# Patient Record
Sex: MALE | Race: BLACK OR AFRICAN AMERICAN | Employment: FULL TIME | URBAN - METROPOLITAN AREA
[De-identification: names, ages, dates, MRNs, and addresses within clinical notes are randomized per-mention and may not be internally consistent; named-entity substitution may affect disease eponyms.]

---

## 2017-01-22 ENCOUNTER — HOSPITAL ENCOUNTER (EMERGENCY)
Age: 52
Discharge: HOME | End: 2017-01-22
Attending: EMERGENCY MEDICINE
Payer: COMMERCIAL

## 2017-01-22 VITALS
SYSTOLIC BLOOD PRESSURE: 129 MMHG | BODY MASS INDEX: 25.01 KG/M2 | HEIGHT: 68 IN | WEIGHT: 165 LBS | OXYGEN SATURATION: 96 % | RESPIRATION RATE: 16 BRPM | TEMPERATURE: 97.6 F | HEART RATE: 63 BPM | DIASTOLIC BLOOD PRESSURE: 82 MMHG

## 2017-01-22 DIAGNOSIS — M54.50 ACUTE RIGHT-SIDED LOW BACK PAIN WITHOUT SCIATICA: Primary | ICD-10-CM

## 2017-01-22 PROCEDURE — 99282 EMERGENCY DEPT VISIT SF MDM: CPT

## 2017-01-22 RX ORDER — METHOCARBAMOL 750 MG/1
750 TABLET, FILM COATED ORAL 3 TIMES DAILY
Qty: 15 TAB | Refills: 0 | Status: SHIPPED | OUTPATIENT
Start: 2017-01-22 | End: 2017-09-26

## 2017-01-22 NOTE — ED PROVIDER NOTES
HPI Comments: 54yo male presents to ER complaining of right lower back pain that started 3 days ago. States he woke up in the morning and when he was getting out of bed felt the discomfort. States he's taken Tylenol and Aleve with little relief. Pain with movement. Denies any weakness, numbness, urinary/bowel complaints, fever/chills. Patient is a 46 y.o. male presenting with back pain. Back Pain    Pertinent negatives include no fever, no numbness, no abdominal pain and no weakness. Past Medical History:   Diagnosis Date    Anemia     Asthma     Asthma     Groin swelling        No past surgical history on file. No family history on file. Social History     Social History    Marital status: SINGLE     Spouse name: N/A    Number of children: N/A    Years of education: N/A     Occupational History    Not on file. Social History Main Topics    Smoking status: Never Smoker    Smokeless tobacco: Never Used    Alcohol use No    Drug use: Not on file    Sexual activity: Not on file     Other Topics Concern    Not on file     Social History Narrative    No narrative on file         ALLERGIES: Review of patient's allergies indicates no known allergies. Review of Systems   Constitutional: Negative for activity change, appetite change, chills and fever. Respiratory: Negative. Cardiovascular: Negative. Gastrointestinal: Negative. Negative for abdominal pain. Genitourinary: Negative for difficulty urinating. Musculoskeletal: Positive for back pain. Negative for gait problem and joint swelling. Skin: Negative. Neurological: Negative for weakness and numbness. All other systems reviewed and are negative. Vitals:    01/22/17 0848   BP: 129/82   Pulse: 63   Resp: 16   Temp: 97.6 °F (36.4 °C)   SpO2: 96%   Weight: 74.8 kg (165 lb)   Height: 5' 8\" (1.727 m)            Physical Exam   Constitutional: He is oriented to person, place, and time.  He appears well-developed and well-nourished. No distress. Neck: Normal range of motion. Cardiovascular: Normal rate, regular rhythm and normal heart sounds. Pulmonary/Chest: Effort normal and breath sounds normal.   Musculoskeletal:   No midline thoracolumbar TTP. Right lumbar musculature is TTP. Pain with extension, rotation or torso. Neurological: He is alert and oriented to person, place, and time. Skin: He is not diaphoretic. Psychiatric: He has a normal mood and affect. Nursing note and vitals reviewed. MDM  Number of Diagnoses or Management Options  Diagnosis management comments: 54yo male with muscular lumbar pain. Rx for Robaxin. Work note x 3 days. Advised to continue Tylenol and Aleve. PCP follow up.      ED Course       Procedures

## 2017-01-22 NOTE — ED TRIAGE NOTES
Pt c/o of right lower back pain that started Thursday morning. Pt has been self medicating with OTC NSAIDS with only temp. Relief. He does not recall any injury that would have precipitated the pain.

## 2017-01-22 NOTE — LETTER
NOTIFICATION RETURN TO WORK / SCHOOL 
 
1/22/2017 10:08 AM 
 
Mr. Selene Palacios [address] [facility]  [place] [address] To Whom It May Concern: 
 
Selene Palacios is currently under the care of [address] [place] [address] EMERGENCY DEPT. He will return to work/school on: 01/26/17 If there are questions or concerns please have the patient contact our office.  
 
 
 
Sincerely, 
 
 
 
 
 
 
KADY Neil

## 2017-05-14 ENCOUNTER — APPOINTMENT (OUTPATIENT)
Dept: CT IMAGING | Age: 52
End: 2017-05-14
Attending: PHYSICIAN ASSISTANT
Payer: COMMERCIAL

## 2017-05-14 ENCOUNTER — HOSPITAL ENCOUNTER (EMERGENCY)
Age: 52
Discharge: HOME | End: 2017-05-14
Attending: EMERGENCY MEDICINE
Payer: COMMERCIAL

## 2017-05-14 VITALS
WEIGHT: 165 LBS | HEIGHT: 68 IN | HEART RATE: 74 BPM | RESPIRATION RATE: 19 BRPM | SYSTOLIC BLOOD PRESSURE: 128 MMHG | BODY MASS INDEX: 25.01 KG/M2 | DIASTOLIC BLOOD PRESSURE: 76 MMHG | TEMPERATURE: 97.4 F | OXYGEN SATURATION: 100 %

## 2017-05-14 DIAGNOSIS — R10.13 ABDOMINAL PAIN, EPIGASTRIC: Primary | ICD-10-CM

## 2017-05-14 LAB
ALBUMIN SERPL BCP-MCNC: 4.3 G/DL (ref 3.4–5)
ALBUMIN/GLOB SERPL: 1 {RATIO} (ref 0.8–1.7)
ALP SERPL-CCNC: 98 U/L (ref 45–117)
ALT SERPL-CCNC: 59 U/L (ref 16–61)
ANION GAP BLD CALC-SCNC: 9 MMOL/L (ref 3–18)
APPEARANCE UR: CLEAR
AST SERPL W P-5'-P-CCNC: 52 U/L (ref 15–37)
BASOPHILS # BLD AUTO: 0 K/UL (ref 0–0.06)
BASOPHILS # BLD: 0 % (ref 0–2)
BILIRUB SERPL-MCNC: 1 MG/DL (ref 0.2–1)
BILIRUB UR QL: NEGATIVE
BUN SERPL-MCNC: 22 MG/DL (ref 7–18)
BUN/CREAT SERPL: 19 (ref 12–20)
CALCIUM SERPL-MCNC: 9.4 MG/DL (ref 8.5–10.1)
CHLORIDE SERPL-SCNC: 101 MMOL/L (ref 100–108)
CO2 SERPL-SCNC: 30 MMOL/L (ref 21–32)
COLOR UR: YELLOW
CREAT SERPL-MCNC: 1.16 MG/DL (ref 0.6–1.3)
DIFFERENTIAL METHOD BLD: ABNORMAL
EOSINOPHIL # BLD: 0.4 K/UL (ref 0–0.4)
EOSINOPHIL NFR BLD: 6 % (ref 0–5)
ERYTHROCYTE [DISTWIDTH] IN BLOOD BY AUTOMATED COUNT: 12.6 % (ref 11.6–14.5)
GLOBULIN SER CALC-MCNC: 4.3 G/DL (ref 2–4)
GLUCOSE SERPL-MCNC: 102 MG/DL (ref 74–99)
GLUCOSE UR STRIP.AUTO-MCNC: NEGATIVE MG/DL
HCT VFR BLD AUTO: 48.5 % (ref 36–48)
HGB BLD-MCNC: 16.8 G/DL (ref 13–16)
HGB UR QL STRIP: NEGATIVE
KETONES UR QL STRIP.AUTO: NEGATIVE MG/DL
LEUKOCYTE ESTERASE UR QL STRIP.AUTO: NEGATIVE
LIPASE SERPL-CCNC: 207 U/L (ref 73–393)
LYMPHOCYTES # BLD AUTO: 14 % (ref 21–52)
LYMPHOCYTES # BLD: 1 K/UL (ref 0.9–3.6)
MCH RBC QN AUTO: 31 PG (ref 24–34)
MCHC RBC AUTO-ENTMCNC: 34.6 G/DL (ref 31–37)
MCV RBC AUTO: 89.5 FL (ref 74–97)
MONOCYTES # BLD: 0.3 K/UL (ref 0.05–1.2)
MONOCYTES NFR BLD AUTO: 5 % (ref 3–10)
NEUTS SEG # BLD: 5.3 K/UL (ref 1.8–8)
NEUTS SEG NFR BLD AUTO: 75 % (ref 40–73)
NITRITE UR QL STRIP.AUTO: NEGATIVE
PH UR STRIP: >8.5 [PH] (ref 5–8)
PLATELET # BLD AUTO: 237 K/UL (ref 135–420)
PMV BLD AUTO: 10 FL (ref 9.2–11.8)
POTASSIUM SERPL-SCNC: 4.1 MMOL/L (ref 3.5–5.5)
PROT SERPL-MCNC: 8.6 G/DL (ref 6.4–8.2)
PROT UR STRIP-MCNC: NEGATIVE MG/DL
RBC # BLD AUTO: 5.42 M/UL (ref 4.7–5.5)
SODIUM SERPL-SCNC: 140 MMOL/L (ref 136–145)
SP GR UR REFRACTOMETRY: 1.02 (ref 1–1.03)
UROBILINOGEN UR QL STRIP.AUTO: 0.2 EU/DL (ref 0.2–1)
WBC # BLD AUTO: 7 K/UL (ref 4.6–13.2)

## 2017-05-14 PROCEDURE — 96375 TX/PRO/DX INJ NEW DRUG ADDON: CPT

## 2017-05-14 PROCEDURE — 74011000250 HC RX REV CODE- 250: Performed by: PHYSICIAN ASSISTANT

## 2017-05-14 PROCEDURE — 74011636320 HC RX REV CODE- 636/320: Performed by: EMERGENCY MEDICINE

## 2017-05-14 PROCEDURE — 74011250636 HC RX REV CODE- 250/636: Performed by: PHYSICIAN ASSISTANT

## 2017-05-14 PROCEDURE — 83690 ASSAY OF LIPASE: CPT | Performed by: PHYSICIAN ASSISTANT

## 2017-05-14 PROCEDURE — 80053 COMPREHEN METABOLIC PANEL: CPT | Performed by: PHYSICIAN ASSISTANT

## 2017-05-14 PROCEDURE — 96361 HYDRATE IV INFUSION ADD-ON: CPT

## 2017-05-14 PROCEDURE — 74011250637 HC RX REV CODE- 250/637: Performed by: PHYSICIAN ASSISTANT

## 2017-05-14 PROCEDURE — 85025 COMPLETE CBC W/AUTO DIFF WBC: CPT | Performed by: PHYSICIAN ASSISTANT

## 2017-05-14 PROCEDURE — 96374 THER/PROPH/DIAG INJ IV PUSH: CPT

## 2017-05-14 PROCEDURE — 81003 URINALYSIS AUTO W/O SCOPE: CPT | Performed by: PHYSICIAN ASSISTANT

## 2017-05-14 PROCEDURE — 74177 CT ABD & PELVIS W/CONTRAST: CPT

## 2017-05-14 PROCEDURE — 99285 EMERGENCY DEPT VISIT HI MDM: CPT

## 2017-05-14 RX ORDER — KETOROLAC TROMETHAMINE 30 MG/ML
30 INJECTION, SOLUTION INTRAMUSCULAR; INTRAVENOUS
Status: COMPLETED | OUTPATIENT
Start: 2017-05-14 | End: 2017-05-14

## 2017-05-14 RX ORDER — ONDANSETRON 2 MG/ML
4 INJECTION INTRAMUSCULAR; INTRAVENOUS
Status: COMPLETED | OUTPATIENT
Start: 2017-05-14 | End: 2017-05-14

## 2017-05-14 RX ORDER — LIDOCAINE HYDROCHLORIDE 20 MG/ML
15 SOLUTION OROPHARYNGEAL AS NEEDED
Status: DISCONTINUED | OUTPATIENT
Start: 2017-05-14 | End: 2017-05-14 | Stop reason: HOSPADM

## 2017-05-14 RX ORDER — HYDROCODONE BITARTRATE AND ACETAMINOPHEN 5; 325 MG/1; MG/1
TABLET ORAL
Qty: 6 TAB | Refills: 0 | Status: SHIPPED | OUTPATIENT
Start: 2017-05-14 | End: 2017-09-26

## 2017-05-14 RX ADMIN — ONDANSETRON 4 MG: 2 INJECTION INTRAMUSCULAR; INTRAVENOUS at 09:05

## 2017-05-14 RX ADMIN — KETOROLAC TROMETHAMINE 30 MG: 30 INJECTION, SOLUTION INTRAMUSCULAR at 09:04

## 2017-05-14 RX ADMIN — LIDOCAINE HYDROCHLORIDE 15 ML: 20 SOLUTION ORAL; TOPICAL at 12:16

## 2017-05-14 RX ADMIN — SODIUM CHLORIDE 1000 ML: 900 INJECTION, SOLUTION INTRAVENOUS at 09:05

## 2017-05-14 RX ADMIN — PHENOBARBITAL 30 ML: 16.2; .1037; .0065; .0194 ELIXIR ORAL at 12:16

## 2017-05-14 RX ADMIN — IOPAMIDOL 100 ML: 612 INJECTION, SOLUTION INTRAVENOUS at 10:39

## 2017-05-14 NOTE — DISCHARGE INSTRUCTIONS

## 2017-05-14 NOTE — ED TRIAGE NOTES
Pt,  C/o abdominal pain, nausea started  About  3 Am, also c/o right lower back  On  & off  For 5 days

## 2017-05-14 NOTE — ED PROVIDER NOTES
HPI Comments: 47 yo M c/o epigastric pain which started this morning around 3 am. Got up and had a normal BM, then went back to bed. When he got up this morning pain was still there. Has not tried anything for pain. Has had some associated nausea. Denies fever, back pain, CP, SOB, vomiting. No other complaints. Patient is a 46 y.o. male presenting with abdominal pain, nausea, and back pain. Abdominal Pain    Associated symptoms include nausea. Pertinent negatives include no fever. Nausea    Associated symptoms include abdominal pain. Pertinent negatives include no fever. Back Pain    Associated symptoms include abdominal pain. Pertinent negatives include no fever. Past Medical History:   Diagnosis Date    Anemia     Asthma     Asthma     Groin swelling        No past surgical history on file. No family history on file. Social History     Social History    Marital status: SINGLE     Spouse name: N/A    Number of children: N/A    Years of education: N/A     Occupational History    Not on file. Social History Main Topics    Smoking status: Never Smoker    Smokeless tobacco: Never Used    Alcohol use No    Drug use: Not on file    Sexual activity: Not on file     Other Topics Concern    Not on file     Social History Narrative    No narrative on file         ALLERGIES: Review of patient's allergies indicates no known allergies. Review of Systems   Constitutional: Negative for fever. Gastrointestinal: Positive for abdominal pain and nausea. All other systems reviewed and are negative. Vitals:    05/14/17 0822 05/14/17 0845 05/14/17 0900 05/14/17 0915   BP: 137/85 142/80 142/87 136/80   Pulse: 73 69 71 70   Resp: 18 18 12 25   Temp: 97.4 °F (36.3 °C)      SpO2: 100%  99% 99%   Weight: 74.8 kg (165 lb)      Height: 5' 8\" (1.727 m)               Physical Exam   Constitutional: He is oriented to person, place, and time. He appears well-developed and well-nourished.  No distress. HENT:   Head: Normocephalic and atraumatic. Eyes: Conjunctivae are normal.   Neck: Normal range of motion. Neck supple. Cardiovascular: Normal rate, regular rhythm and normal heart sounds. Pulmonary/Chest: Effort normal and breath sounds normal. No respiratory distress. He has no wheezes. He has no rales. Abdominal: Soft. Normal appearance. There is tenderness in the epigastric area. Musculoskeletal: Normal range of motion. Neurological: He is alert and oriented to person, place, and time. Skin: Skin is warm and dry. Psychiatric: He has a normal mood and affect. His behavior is normal. Judgment and thought content normal.   Nursing note and vitals reviewed. MDM  Number of Diagnoses or Management Options  Abdominal pain, epigastric:     ED Course       Procedures    -------------------------------------------------------------------------------------------------------------------     EKG INTERPRETATIONS:      RADIOLOGY RESULTS:   CT ABD PELV W CONT   Final Result          LABORATORY RESULTS:  Recent Results (from the past 12 hour(s))   CBC WITH AUTOMATED DIFF    Collection Time: 05/14/17  9:00 AM   Result Value Ref Range    WBC 7.0 4.6 - 13.2 K/uL    RBC 5.42 4.70 - 5.50 M/uL    HGB 16.8 (H) 13.0 - 16.0 g/dL    HCT 48.5 (H) 36.0 - 48.0 %    MCV 89.5 74.0 - 97.0 FL    MCH 31.0 24.0 - 34.0 PG    MCHC 34.6 31.0 - 37.0 g/dL    RDW 12.6 11.6 - 14.5 %    PLATELET 906 458 - 433 K/uL    MPV 10.0 9.2 - 11.8 FL    NEUTROPHILS 75 (H) 40 - 73 %    LYMPHOCYTES 14 (L) 21 - 52 %    MONOCYTES 5 3 - 10 %    EOSINOPHILS 6 (H) 0 - 5 %    BASOPHILS 0 0 - 2 %    ABS. NEUTROPHILS 5.3 1.8 - 8.0 K/UL    ABS. LYMPHOCYTES 1.0 0.9 - 3.6 K/UL    ABS. MONOCYTES 0.3 0.05 - 1.2 K/UL    ABS. EOSINOPHILS 0.4 0.0 - 0.4 K/UL    ABS.  BASOPHILS 0.0 0.0 - 0.06 K/UL    DF AUTOMATED     METABOLIC PANEL, COMPREHENSIVE    Collection Time: 05/14/17  9:00 AM   Result Value Ref Range    Sodium 140 136 - 145 mmol/L    Potassium 4.1 3.5 - 5.5 mmol/L    Chloride 101 100 - 108 mmol/L    CO2 30 21 - 32 mmol/L    Anion gap 9 3.0 - 18 mmol/L    Glucose 102 (H) 74 - 99 mg/dL    BUN 22 (H) 7.0 - 18 MG/DL    Creatinine 1.16 0.6 - 1.3 MG/DL    BUN/Creatinine ratio 19 12 - 20      GFR est AA >60 >60 ml/min/1.73m2    GFR est non-AA >60 >60 ml/min/1.73m2    Calcium 9.4 8.5 - 10.1 MG/DL    Bilirubin, total 1.0 0.2 - 1.0 MG/DL    ALT (SGPT) 59 16 - 61 U/L    AST (SGOT) 52 (H) 15 - 37 U/L    Alk. phosphatase 98 45 - 117 U/L    Protein, total 8.6 (H) 6.4 - 8.2 g/dL    Albumin 4.3 3.4 - 5.0 g/dL    Globulin 4.3 (H) 2.0 - 4.0 g/dL    A-G Ratio 1.0 0.8 - 1.7     LIPASE    Collection Time: 05/14/17  9:00 AM   Result Value Ref Range    Lipase 207 73 - 393 U/L   URINALYSIS W/ RFLX MICROSCOPIC    Collection Time: 05/14/17 11:50 AM   Result Value Ref Range    Color YELLOW      Appearance CLEAR      Specific gravity 1.023 1.005 - 1.030      pH (UA) >8.5 (H) 5.0 - 8.0    Protein NEGATIVE  NEG mg/dL    Glucose NEGATIVE  NEG mg/dL    Ketone NEGATIVE  NEG mg/dL    Bilirubin NEGATIVE  NEG      Blood NEGATIVE  NEG      Urobilinogen 0.2 0.2 - 1.0 EU/dL    Nitrites NEGATIVE  NEG      Leukocyte Esterase NEGATIVE  NEG             CONSULTATIONS:        PROGRESS NOTES:    12:23 PM Pt well appearing and in NAD. Feeling better with meds here in ED. Labs unremarkable. CT negative for acute process. Will need GI f/u as outpatient. Gave return precautions. Lengthy D/W pt regarding possible worsening of pt's condition, need for follow up and strict ED return instructions for any worsening symptoms. DISPOSITION:  ED DIAGNOSIS & DISPOSITION INFORMATION  Diagnosis:   1.  Abdominal pain, epigastric          Disposition: home    Follow-up Information     Follow up With Details Comments 615 Luz Alvarado MD Call on 5/15/2017 To make a follow up appointment [address] [facility] 3[address] 12[address] [facility]      [facility]  Immediately if symptoms worsen [address] [place] [address] [address] [address]  [phone]          Patient's Medications   Start Taking    HYDROCODONE-ACETAMINOPHEN (NORCO) 5-325 MG PER TABLET    Take 1-2 tablets PO every 4-6 hours as needed for pain control. If over the counter ibuprofen or acetaminophen was suggested, then only take the vicodin for pain not well controlled with the over the counter medication. Continue Taking    ASCORBIC ACID (VITAMIN C) 500 MG TABLET    Take  by mouth. CETIRIZINE (ZYRTEC) 10 MG CAP    Take  by mouth. FLUTICASONE-SALMETEROL (ADVAIR DISKUS) 500-50 MCG/DOSE DISKUS INHALER    Take 1 Puff by inhalation every twelve (12) hours. INHALATIONAL SPACING DEVICE (AEROCHAMBER MV)    1 Each by Does Not Apply route as needed. METHOCARBAMOL (ROBAXIN-750) 750 MG TABLET    Take 1 Tab by mouth three (3) times daily. MULTIVITAMINS-MINERALS-LUTEIN (CENTRUM SILVER) TAB    Take  by mouth. PSEUDOEPHEDRINE (SUDAFED) 30 MG TABLET    Take 1 Tab by mouth every four (4) hours as needed for Congestion.    These Medications have changed    No medications on file   Stop Taking    No medications on file

## 2017-09-25 ENCOUNTER — APPOINTMENT (OUTPATIENT)
Dept: GENERAL RADIOLOGY | Age: 52
DRG: 202 | End: 2017-09-25
Attending: EMERGENCY MEDICINE
Payer: COMMERCIAL

## 2017-09-25 ENCOUNTER — HOSPITAL ENCOUNTER (INPATIENT)
Age: 52
LOS: 1 days | Discharge: HOME | DRG: 202 | End: 2017-09-26
Attending: EMERGENCY MEDICINE | Admitting: INTERNAL MEDICINE
Payer: COMMERCIAL

## 2017-09-25 DIAGNOSIS — J45.41 MODERATE PERSISTENT ASTHMA WITH ACUTE EXACERBATION: Primary | ICD-10-CM

## 2017-09-25 PROCEDURE — 77030029684 HC NEB SM VOL KT MONA -A

## 2017-09-25 PROCEDURE — 96375 TX/PRO/DX INJ NEW DRUG ADDON: CPT

## 2017-09-25 PROCEDURE — 94640 AIRWAY INHALATION TREATMENT: CPT

## 2017-09-25 PROCEDURE — 99283 EMERGENCY DEPT VISIT LOW MDM: CPT

## 2017-09-25 PROCEDURE — 96365 THER/PROPH/DIAG IV INF INIT: CPT

## 2017-09-25 PROCEDURE — 93005 ELECTROCARDIOGRAM TRACING: CPT

## 2017-09-25 PROCEDURE — 71010 XR CHEST PORT: CPT

## 2017-09-25 PROCEDURE — 96361 HYDRATE IV INFUSION ADD-ON: CPT

## 2017-09-25 RX ORDER — IPRATROPIUM BROMIDE AND ALBUTEROL SULFATE 2.5; .5 MG/3ML; MG/3ML
3 SOLUTION RESPIRATORY (INHALATION)
Status: DISCONTINUED | OUTPATIENT
Start: 2017-09-25 | End: 2017-09-26

## 2017-09-25 RX ORDER — ALBUTEROL SULFATE 0.83 MG/ML
2.5 SOLUTION RESPIRATORY (INHALATION)
Status: DISPENSED | OUTPATIENT
Start: 2017-09-25 | End: 2017-09-25

## 2017-09-25 RX ORDER — SODIUM CHLORIDE 9 MG/ML
100 INJECTION, SOLUTION INTRAVENOUS CONTINUOUS
Status: DISCONTINUED | OUTPATIENT
Start: 2017-09-25 | End: 2017-09-26

## 2017-09-25 RX ORDER — MAGNESIUM SULFATE HEPTAHYDRATE 500 MG/ML
2 INJECTION, SOLUTION INTRAMUSCULAR; INTRAVENOUS
Status: DISCONTINUED | OUTPATIENT
Start: 2017-09-25 | End: 2017-09-25 | Stop reason: RX

## 2017-09-25 RX ORDER — MAGNESIUM SULFATE HEPTAHYDRATE 40 MG/ML
2 INJECTION, SOLUTION INTRAVENOUS
Status: COMPLETED | OUTPATIENT
Start: 2017-09-25 | End: 2017-09-26

## 2017-09-25 NOTE — IP AVS SNAPSHOT
Nesha Herrera 
 
 
 [address] [facility] [address] [facility] Patient: Petra Mistry MRN: [id number] Current Discharge Medication List  
  
START taking these medications Dose & Instructions Dispensing Information Comments Morning Noon Evening Bedtime  
 predniSONE 20 mg tablet Commonly known as:  Donato Noss Start taking on:  9/27/2017 Your last dose was: Your next dose is:    
   
   
 Dose:  60 mg Take 3 Tabs by mouth daily (with breakfast) for 4 days. Indications: BRONCHIAL ASTHMA Quantity:  12 Tab Refills:  0 CONTINUE these medications which have NOT CHANGED Dose & Instructions Dispensing Information Comments Morning Noon Evening Bedtime CENTRUM SILVER Tab tablet Generic drug:  multivitamins-minerals-lutein Your last dose was: Your next dose is: Take  by mouth. Refills:  0  
     
   
   
   
  
 cyclobenzaprine 10 mg tablet Commonly known as:  FLEXERIL Your last dose was: Your next dose is:    
   
   
 Dose:  10 mg Take 10 mg by mouth daily as needed for Muscle Spasm(s). Refills:  0  
     
   
   
   
  
 fluticasone-salmeterol 500-50 mcg/dose diskus inhaler Commonly known as:  ADVAIR DISKUS Your last dose was: Your next dose is:    
   
   
 Dose:  1 Puff Take 1 Puff by inhalation every twelve (12) hours. Quantity:  14 Inhaler Refills:  0  
     
   
   
   
  
 inhalational spacing device Commonly known as:  AEROCHAMBER MV Your last dose was: Your next dose is:    
   
   
 Dose:  1 Each  
1 Each by Does Not Apply route as needed. Quantity:  1 Device Refills:  0  
     
   
   
   
  
 VITAMIN C 500 mg tablet Generic drug:  ascorbic acid (vitamin C) Your last dose was: Your next dose is: Take  by mouth. Refills:  0 ZyrTEC 10 mg Cap Generic drug:  Cetirizine Your last dose was: Your next dose is: Take  by mouth. Refills:  0 STOP taking these medications HYDROcodone-acetaminophen 5-325 mg per tablet Commonly known as:  NORCO  
   
  
 methocarbamol 750 mg tablet Commonly known as:  ROBAXIN-750  
   
  
 pseudoephedrine 30 mg tablet Commonly known as:  SUDAFED Where to Get Your Medications Information on where to get these meds will be given to you by the nurse or doctor. ! Ask your nurse or doctor about these medications  
  predniSONE 20 mg tablet

## 2017-09-25 NOTE — LETTER
[address] [place] [address] Dr Yasmany Rae [place] [address] 
[phone] Work/School Note Date: 9/26/17 To Whom It May concern: 
 
Marlin Obrien was seen and treated today in the hospital from 25 September 2017 until 1800 26 September 2017. Marlin Obrien may return to work on Thursday, 28 September 2017.  
 
Sincerely, 
 
 
 
 
Natalia Hayes MD

## 2017-09-25 NOTE — IP AVS SNAPSHOT
[facility] 
 
 
 [address] [facility] [address] [place], [place] [address]chloe Jean-Pierre Patient: Rosaura Hubbard MRN: [id number] You are allergic to the following No active allergies Recent Documentation Weight BMI Smoking Status 74.8 kg 25.09 kg/m2 Never Smoker Emergency Contacts Name Discharge Info Relation Home Work Mobile [address] Nandini Trejo CAREGIVER [3] Parent [1] [phone] About your hospitalization You were admitted on:  September 26, 2017 You last received care in the:  [facility] [facility] You were discharged on:  September 26, 2017 Unit phone number:  [phone] Why you were hospitalized Your primary diagnosis was:  Acute Asthma Exacerbation Your diagnoses also included:  Acute Kidney Injury (Hcc) Providers Seen During Your Hospitalizations Provider Role Specialty Primary office phone Antoinette Torres MD Attending Provider Emergency Medicine [phone] Goldy Vergara MD Attending Provider Internal Medicine [phone] Your Primary Care Physician (PCP) Primary Care Physician Office Phone Office Fax Phelps Marii GARCIA [phone] ** None ** Follow-up Information Follow up With Details Comments Contact Info Vicente Hamilton DO Schedule an appointment as soon as possible for a visit  will call tomorrow and schedule appointment. [address] [place] [id number] 
[phone] Current Discharge Medication List  
  
START taking these medications Dose & Instructions Dispensing Information Comments Morning Noon Evening Bedtime  
 predniSONE 20 mg tablet Commonly known as:  Rebecca Eraer Start taking on:  9/27/2017 Your last dose was: Your next dose is:    
   
   
 Dose:  60 mg Take 3 Tabs by mouth daily (with breakfast) for 4 days. Indications: BRONCHIAL ASTHMA Quantity:  12 Tab Refills:  0 CONTINUE these medications which have NOT CHANGED Dose & Instructions Dispensing Information Comments Morning Noon Evening Bedtime CENTRUM SILVER Tab tablet Generic drug:  multivitamins-minerals-lutein Your last dose was: Your next dose is: Take  by mouth. Refills:  0  
     
   
   
   
  
 cyclobenzaprine 10 mg tablet Commonly known as:  FLEXERIL Your last dose was: Your next dose is:    
   
   
 Dose:  10 mg Take 10 mg by mouth daily as needed for Muscle Spasm(s). Refills:  0  
     
   
   
   
  
 fluticasone-salmeterol 500-50 mcg/dose diskus inhaler Commonly known as:  ADVAIR DISKUS Your last dose was: Your next dose is:    
   
   
 Dose:  1 Puff Take 1 Puff by inhalation every twelve (12) hours. Quantity:  14 Inhaler Refills:  0  
     
   
   
   
  
 inhalational spacing device Commonly known as:  AEROCHAMBER MV Your last dose was: Your next dose is:    
   
   
 Dose:  1 Each  
1 Each by Does Not Apply route as needed. Quantity:  1 Device Refills:  0  
     
   
   
   
  
 VITAMIN C 500 mg tablet Generic drug:  ascorbic acid (vitamin C) Your last dose was: Your next dose is: Take  by mouth. Refills:  0 ZyrTEC 10 mg Cap Generic drug:  Cetirizine Your last dose was: Your next dose is: Take  by mouth. Refills:  0 STOP taking these medications HYDROcodone-acetaminophen 5-325 mg per tablet Commonly known as:  NORCO  
   
  
 methocarbamol 750 mg tablet Commonly known as:  ROBAXIN-750  
   
  
 pseudoephedrine 30 mg tablet Commonly known as:  SUDAFED Where to Get Your Medications Information on where to get these meds will be given to you by the nurse or doctor. ! Ask your nurse or doctor about these medications  
  predniSONE 20 mg tablet Discharge Instructions MyChart Activation Thank you for requesting access to Songdrop. Please follow the instructions below to securely access and download your online medical record. Songdrop allows you to send messages to your doctor, view your test results, renew your prescriptions, schedule appointments, and more. How Do I Sign Up? 1. In your internet browser, go to [email or web address]. 2. Click on the First Time User? Click Here link in the Sign In box. You will see the New Member Sign Up page. 3. Enter your Songdrop Access Code exactly as it appears below. You will not need to use this code after youve completed the sign-up process. If you do not sign up before the expiration date, you must request a new code. Songdrop Access Code: [email or web address] Expires: 2017 12:16 PM (This is the date your Songdrop access code will ) 4. Enter the last four digits of your Social Security Number (xxxx) and Date of Birth (mm/dd/yyyy) as indicated and click Submit. You will be taken to the next sign-up page. 5. Create a Songdrop ID. This will be your Songdrop login ID and cannot be changed, so think of one that is secure and easy to remember. 6. Create a Songdrop password. You can change your password at any time. 7. Enter your Password Reset Question and Answer. This can be used at a later time if you forget your password. 8. Enter your e-mail address. You will receive e-mail notification when new information is available in [address] 9. Click Sign Up. You can now view and download portions of your medical record. 10. Click the Download Summary menu link to download a portable copy of your medical information. Additional Information If you have questions, please visit the Frequently Asked Questions section of the Geosho website at [email or web address] Remember, WineMeNowhart is NOT to be used for urgent needs. For medical emergencies, dial 911. Patient armband removed and shredded DISCHARGE SUMMARY from Nurse The following personal items are in your possession at time of discharge: 
 
Dental Appliances: None Visual Aid: Glasses Home Medications: None Jewelry: Juanis Adjutant Clothing: At bedside Other Valuables: Cell Phone PATIENT INSTRUCTIONS: 
 
 
F-face looks uneven A-arms unable to move or move unevenly S-speech slurred or non-existent T-time-call 911 as soon as signs and symptoms begin-DO NOT go Back to bed or wait to see if you get better-TIME IS BRAIN. Warning Signs of HEART ATTACK Call 911 if you have these symptoms: 
? Chest discomfort. Most heart attacks involve discomfort in the center of the chest that lasts more than a few minutes, or that goes away and comes back. It can feel like uncomfortable pressure, squeezing, fullness, or pain. ? Discomfort in other areas of the upper body. Symptoms can include pain or discomfort in one or both arms, the back, neck, jaw, or stomach. ? Shortness of breath with or without chest discomfort. ? Other signs may include breaking out in a cold sweat, nausea, or lightheadedness. Don't wait more than five minutes to call [address]! Fast action can save your life. Calling 911 is almost always the fastest way to get lifesaving treatment. Emergency Medical Services staff can begin treatment when they arrive  up to an hour sooner than if someone gets to the hospital by car. The discharge information has been reviewed with the patient. The patient verbalized understanding.  
 
Discharge medications reviewed with the patient and appropriate educational materials and side effects teaching were provided. 
 
 
 
 
-Continue to drink plenty of fluids 
-Return to work on 9/28/2017(note provided to patient) 
-Call tomorrow to schedule a follow up appointment with [address]. Follow up with [address] in the next 2 to 7 days. Learning About Asthma Triggers What are triggers? When you have asthma, certain things can make your symptoms worse. These are called triggers. They include: · Cigarette smoke or air pollution. · Things you are allergic to, such as: 
¨ Pollen, mold, or dust mites. ¨ Pet hair, skin, or saliva. · Illnesses, like colds, flu, or pneumonia. · Exercise. · Dry, cold air. How do triggers affect asthma? Triggers can make it harder for your lungs to work as they should and can lead to sudden difficulty breathing and other symptoms. When you are around a trigger, an asthma attack is more likely. If your symptoms are severe, you may need emergency treatment or have to go to the hospital for treatment. If you know what your triggers are and can avoid them, you may be able to prevent asthma attacks, reduce how often you have them, and make them less severe. What can you do to avoid triggers? The first thing is to know your triggers. When you are having symptoms, note the things around you that might be causing them. Then look for patterns in what may be triggering your symptoms. Record your triggers on a piece of paper or in an asthma diary. When you have your list of possible triggers, work with your doctor to find ways to avoid them. You also can check how well your lungs are working by measuring your peak expiratory flow (PEF) throughout the day. Your PEF may drop when you are near things that trigger symptoms. Here are some ways to avoid a few common triggers. · Do not smoke or allow others to smoke around you.  If you need help quitting, talk to your doctor about stop-smoking programs and medicines. These can increase your chances of quitting for good. · If there is a lot of pollution, pollen, or dust outside, stay at home and keep your windows closed. Use an air conditioner or air filter in your home. Check your local weather report or newspaper for air quality and pollen reports. · Get a flu shot every year. Talk to your doctor about getting a pneumococcal shot. Wash your hands often to prevent infections. · Avoid exercising outdoors in cold weather. If you are outdoors in cold weather, wear a scarf around your face and breathe through your nose. How can you manage an asthma attack? · If you have an asthma action plan, follow the plan. In general: ¨ Use your quick-relief inhaler as directed by your doctor. If your symptoms do not get better after you use your medicine, have someone take you to the emergency room. Call an ambulance if needed. ¨ If your doctor has given you other inhaled medicines or steroid pills, take them as directed. Where can you learn more? Go to Paymetric.be Enter H229 in the search box to learn more about \"Learning About Asthma Triggers. \"  
© 7552-8318 Healthwise, Incorporated. Care instructions adapted under license by Batista Ramachandran [employer] (which disclaims liability or warranty for this information). This care instruction is for use with your licensed healthcare professional. If you have questions about a medical condition or this instruction, always ask your healthcare professional. [place] [address] any warranty or liability for your use of this information. Content Version: 28.5.561295; Last Revised: February 23, 2012 Asthma: Your Action Plan Sample Action Plan Controller medicine action plan Fill in the blank spaces and boxes that apply for all sections. · Name of your controller medicine: 
¨ ____________________________________________ · How much of this medicine do you take? ¨ ____________________________________________ · How often do you take this medicine? ¨ ____________________________________________ · Other instructions? ¨ ____________________________________________ Quick-relief medicine action plan · Name of your quick-relief medicine: 
¨ ____________________________________________ · How much of this medicine do you take? ¨ ____________________________________________ · How often do you take this medicine? ¨ ____________________________________________ Asthma Zones GREEN ZONE: This is where you want to be! Green zone symptoms · You have no shortness of breath or chest tightness. You are not coughing or wheezing. · You can do all of your usual activities. · You sleep well at night. Green zone peak flow (if you use a peak flow meter) · ______ or more (80% or more of your personal best) Green zone actions (Check the boxes and fill in the blank spaces that apply.) [ ] You take your controller medicine(s) every day. [ ] Orlena Catching are staying away from your asthma triggers. [ ] You take quick-relief medicine (called _____________________) ______ minutes before exercise. YELLOW ZONE: Your asthma is getting worse. Yellow zone symptoms · You are short of breath or have chest tightness. You are coughing or wheezing. · You have symptoms that keep you up at night. · You can do some, but not all, of your usual activities. Yellow zone peak flow (if you use a peak flow meter) · ______ to ______ (50% to 79% of your personal best) Yellow zone actions (Check the boxes and fill in the blank spaces that apply.) [ ] Take _____ puff(s) of quick-relief medicine called ______________________. Repeat _____ times. [ ] If your symptoms don't get better or your peak flow has not returned to the green zone in 1 hour, then: · [ ] Take _____ puff(s) of medicine called ______________________. Take it ____ times a day. · [ ] Begin or increase treatment with corticosteroid pills. Take ______ mg of medicine called ____________________________ every __________. · [ ] Call your doctor at this number: ____________________. RED ZONE: Danger! Red zone symptoms · You are very short of breath. · You can't do your usual activities. · Quick-relief medicine doesn't help. Or your symptoms don't get better after 24 hours in the yellow zone. Red zone peak flow (if you use a peak flow meter) · Less than _______ (less than 50% of your personal best) Red zone actions (Check the boxes and fill in the blank spaces that apply.) [ ] Take _____ puff(s) of quick-relief medicine called ____________________________. Repeat ______ times. [ ] Begin or increase treatment with corticosteroid pills. Take ________ mg now. [ ] Call your doctor at this number: _________________. If you can't contact your doctor, go to the emergency department. Call 911 or ___________________. [ ] Other numbers you might call are: ___________________________________. When should you call for help? Call 911 anytime you think you may need emergency care. For example, call if: 
· You have severe trouble breathing. Call your doctor now or seek immediate medical care if: 
· You are in the red zone of your asthma action plan. · You've used your quick-relief medicine but are still having trouble breathing. · You cough up blood. · You have new or worse trouble breathing. · You cough up dark brown or bloody mucus (sputum). Watch closely for changes in your health, and be sure to contact your doctor if: 
· You need to use quick-relief medicine more than 2 days each week (unless it's just for exercise). · Your coughing and wheezing get worse. Follow-up care is a key part of your treatment and safety. Be sure to make and go to all appointments, and call your doctor if you are having problems.  It's also a good idea to know your test results and keep a list of the medicines you take. Where can you learn more? Go to http://huy-leidy.info/. Enter 77 27 04 in the search box to learn more about \"Asthma: Your Action Plan. \" Current as of: March 25, 2017 Content Version: 11.3 © 4836-5828 [employer]. Care instructions adapted under license by [employer] (which disclaims liability or warranty for this information). If you have questions about a medical condition or this instruction, always ask your healthcare professional. [place] [address] any warranty or liability for your use of this information. Discharge Orders Procedure Order Date Status Priority Quantity Spec Type Associated Dx ACTIVITY AFTER DISCHARGE Patient should: Restrict weight bearing, Restrict lifting, Other (specify) Avoid strenuous activity over th e next several weeks. Pt encouraged to rest. Return to care for worsening shortness of breath, fevers, and symptom. .. 09/26/17 1656 Normal Routine 1 Comments:  Avoid strenuous activity over th e next several weeks. Pt encouraged to rest. Return to care for worsening shortness of breath, fevers, and symptoms that do no improve with current asthma treatment. Questions: Patient should:  Restrict weight bearing Patient should:  Restrict lifting Patient should: Other (specify) DIET REGULAR 09/26/17 1656 Normal Routine 1 MoneyMail Announcement We are excited to announce that we are making your provider's discharge notes available to you in MoneyMail. You will see these notes when they are completed and signed by the physician that discharged you from your recent hospital stay. If you have any questions or concerns about any information you see in MoneyMail, please call the Health Information Department where you were seen or reach out to your Primary Care Provider for more information about your plan of care. Introducing [facility]! Samantha Russo introduces TripAdvisor patient portal. Now you can access parts of your medical record, email your doctor's office, and request medication refills online. 1. In your internet browser, go to [email or web address] 2. Click on the First Time User? Click Here link in the Sign In box. You will see the New Member Sign Up page. 3. Enter your TripAdvisor Access Code exactly as it appears below. You will not need to use this code after youve completed the sign-up process. If you do not sign up before the expiration date, you must request a new code. · TripAdvisor Access Code: [email or web address] Expires: 12/25/2017 12:16 PM 
 
4. Enter the last four digits of your Social Security Number (xxxx) and Date of Birth (mm/dd/yyyy) as indicated and click Submit. You will be taken to the next sign-up page. 5. Create a TripAdvisor ID. This will be your TripAdvisor login ID and cannot be changed, so think of one that is secure and easy to remember. 6. Create a TripAdvisor password. You can change your password at any time. 7. Enter your Password Reset Question and Answer. This can be used at a later time if you forget your password. 8. Enter your e-mail address. You will receive e-mail notification when new information is available in [address] 9. Click Sign Up. You can now view and download portions of your medical record. 10. Click the Download Summary menu link to download a portable copy of your medical information. If you have questions, please visit the Frequently Asked Questions section of the TripAdvisor website. Remember, TripAdvisor is NOT to be used for urgent needs. For medical emergencies, dial 911. Now available from your iPhone and Android! General Information Please provide this summary of care documentation to your next provider. Patient Signature:  ____________________________________________________________ Date:  ____________________________________________________________  
  
Claudene Born Provider Signature:  ____________________________________________________________ Date:  ____________________________________________________________

## 2017-09-26 VITALS
BODY MASS INDEX: 25.09 KG/M2 | HEART RATE: 80 BPM | TEMPERATURE: 97.7 F | RESPIRATION RATE: 16 BRPM | OXYGEN SATURATION: 95 % | SYSTOLIC BLOOD PRESSURE: 133 MMHG | DIASTOLIC BLOOD PRESSURE: 82 MMHG | WEIGHT: 165 LBS

## 2017-09-26 PROBLEM — J45.901 ASTHMA EXACERBATION: Status: ACTIVE | Noted: 2017-09-26

## 2017-09-26 PROBLEM — N17.9 ACUTE KIDNEY INJURY (HCC): Status: ACTIVE | Noted: 2017-09-26

## 2017-09-26 LAB
ANION GAP SERPL CALC-SCNC: 13 MMOL/L (ref 3–18)
ANION GAP SERPL CALC-SCNC: 9 MMOL/L (ref 3–18)
ATRIAL RATE: 56 BPM
BASOPHILS # BLD: 0 K/UL (ref 0–0.1)
BASOPHILS # BLD: 0 K/UL (ref 0–0.1)
BASOPHILS NFR BLD: 0 % (ref 0–2)
BASOPHILS NFR BLD: 1 % (ref 0–2)
BUN SERPL-MCNC: 18 MG/DL (ref 7–18)
BUN SERPL-MCNC: 23 MG/DL (ref 7–18)
BUN/CREAT SERPL: 13 (ref 12–20)
BUN/CREAT SERPL: 16 (ref 12–20)
CALCIUM SERPL-MCNC: 8.8 MG/DL (ref 8.5–10.1)
CALCIUM SERPL-MCNC: 9.1 MG/DL (ref 8.5–10.1)
CALCULATED P AXIS, ECG09: 57 DEGREES
CALCULATED R AXIS, ECG10: 5 DEGREES
CALCULATED T AXIS, ECG11: 43 DEGREES
CHLORIDE SERPL-SCNC: 109 MMOL/L (ref 100–108)
CHLORIDE SERPL-SCNC: 109 MMOL/L (ref 100–108)
CO2 SERPL-SCNC: 19 MMOL/L (ref 21–32)
CO2 SERPL-SCNC: 25 MMOL/L (ref 21–32)
CREAT SERPL-MCNC: 1.37 MG/DL (ref 0.6–1.3)
CREAT SERPL-MCNC: 1.41 MG/DL (ref 0.6–1.3)
DIAGNOSIS, 93000: NORMAL
DIFFERENTIAL METHOD BLD: ABNORMAL
DIFFERENTIAL METHOD BLD: ABNORMAL
EOSINOPHIL # BLD: 0 K/UL (ref 0–0.4)
EOSINOPHIL # BLD: 0.6 K/UL (ref 0–0.4)
EOSINOPHIL NFR BLD: 0 % (ref 0–5)
EOSINOPHIL NFR BLD: 10 % (ref 0–5)
ERYTHROCYTE [DISTWIDTH] IN BLOOD BY AUTOMATED COUNT: 11.9 % (ref 11.6–14.5)
ERYTHROCYTE [DISTWIDTH] IN BLOOD BY AUTOMATED COUNT: 12.3 % (ref 11.6–14.5)
GLUCOSE SERPL-MCNC: 130 MG/DL (ref 74–99)
GLUCOSE SERPL-MCNC: 95 MG/DL (ref 74–99)
HCT VFR BLD AUTO: 42.2 % (ref 36–48)
HCT VFR BLD AUTO: 42.2 % (ref 36–48)
HGB BLD-MCNC: 14.5 G/DL (ref 13–16)
HGB BLD-MCNC: 14.7 G/DL (ref 13–16)
LYMPHOCYTES # BLD: 0.9 K/UL (ref 0.9–3.6)
LYMPHOCYTES # BLD: 2.4 K/UL (ref 0.9–3.6)
LYMPHOCYTES NFR BLD: 14 % (ref 21–52)
LYMPHOCYTES NFR BLD: 38 % (ref 21–52)
MAGNESIUM SERPL-MCNC: 1.9 MG/DL (ref 1.6–2.6)
MAGNESIUM SERPL-MCNC: 2.2 MG/DL (ref 1.6–2.6)
MCH RBC QN AUTO: 31 PG (ref 24–34)
MCH RBC QN AUTO: 31.1 PG (ref 24–34)
MCHC RBC AUTO-ENTMCNC: 34.4 G/DL (ref 31–37)
MCHC RBC AUTO-ENTMCNC: 34.8 G/DL (ref 31–37)
MCV RBC AUTO: 89.4 FL (ref 74–97)
MCV RBC AUTO: 90.4 FL (ref 74–97)
MONOCYTES # BLD: 0.1 K/UL (ref 0.05–1.2)
MONOCYTES # BLD: 0.4 K/UL (ref 0.05–1.2)
MONOCYTES NFR BLD: 1 % (ref 3–10)
MONOCYTES NFR BLD: 6 % (ref 3–10)
NEUTS SEG # BLD: 2.8 K/UL (ref 1.8–8)
NEUTS SEG # BLD: 5.4 K/UL (ref 1.8–8)
NEUTS SEG NFR BLD: 45 % (ref 40–73)
NEUTS SEG NFR BLD: 85 % (ref 40–73)
P-R INTERVAL, ECG05: 194 MS
PLATELET # BLD AUTO: 263 K/UL (ref 135–420)
PLATELET # BLD AUTO: 271 K/UL (ref 135–420)
PMV BLD AUTO: 10.7 FL (ref 9.2–11.8)
PMV BLD AUTO: 10.9 FL (ref 9.2–11.8)
POTASSIUM SERPL-SCNC: 4 MMOL/L (ref 3.5–5.5)
POTASSIUM SERPL-SCNC: 5.2 MMOL/L (ref 3.5–5.5)
Q-T INTERVAL, ECG07: 390 MS
QRS DURATION, ECG06: 74 MS
QTC CALCULATION (BEZET), ECG08: 376 MS
RBC # BLD AUTO: 4.67 M/UL (ref 4.7–5.5)
RBC # BLD AUTO: 4.72 M/UL (ref 4.7–5.5)
SODIUM SERPL-SCNC: 141 MMOL/L (ref 136–145)
SODIUM SERPL-SCNC: 143 MMOL/L (ref 136–145)
VENTRICULAR RATE, ECG03: 56 BPM
WBC # BLD AUTO: 6.2 K/UL (ref 4.6–13.2)
WBC # BLD AUTO: 6.4 K/UL (ref 4.6–13.2)

## 2017-09-26 PROCEDURE — 74011000250 HC RX REV CODE- 250: Performed by: EMERGENCY MEDICINE

## 2017-09-26 PROCEDURE — 65270000029 HC RM PRIVATE

## 2017-09-26 PROCEDURE — 83735 ASSAY OF MAGNESIUM: CPT

## 2017-09-26 PROCEDURE — 83735 ASSAY OF MAGNESIUM: CPT | Performed by: INTERNAL MEDICINE

## 2017-09-26 PROCEDURE — 74011000250 HC RX REV CODE- 250

## 2017-09-26 PROCEDURE — 74011636637 HC RX REV CODE- 636/637: Performed by: FAMILY MEDICINE

## 2017-09-26 PROCEDURE — 80048 BASIC METABOLIC PNL TOTAL CA: CPT

## 2017-09-26 PROCEDURE — 74011000250 HC RX REV CODE- 250: Performed by: FAMILY MEDICINE

## 2017-09-26 PROCEDURE — 74011250636 HC RX REV CODE- 250/636: Performed by: EMERGENCY MEDICINE

## 2017-09-26 PROCEDURE — 80048 BASIC METABOLIC PNL TOTAL CA: CPT | Performed by: INTERNAL MEDICINE

## 2017-09-26 PROCEDURE — 85025 COMPLETE CBC W/AUTO DIFF WBC: CPT

## 2017-09-26 PROCEDURE — 36415 COLL VENOUS BLD VENIPUNCTURE: CPT | Performed by: INTERNAL MEDICINE

## 2017-09-26 PROCEDURE — 74011250637 HC RX REV CODE- 250/637: Performed by: FAMILY MEDICINE

## 2017-09-26 RX ORDER — ENOXAPARIN SODIUM 100 MG/ML
40 INJECTION SUBCUTANEOUS EVERY 24 HOURS
Status: DISCONTINUED | OUTPATIENT
Start: 2017-09-26 | End: 2017-09-26 | Stop reason: HOSPADM

## 2017-09-26 RX ORDER — ALBUTEROL SULFATE 0.83 MG/ML
2.5 SOLUTION RESPIRATORY (INHALATION)
Status: COMPLETED | OUTPATIENT
Start: 2017-09-26 | End: 2017-09-26

## 2017-09-26 RX ORDER — CYCLOBENZAPRINE HCL 10 MG
10 TABLET ORAL
COMMUNITY

## 2017-09-26 RX ORDER — ALBUTEROL SULFATE 1.25 MG/3ML
2.5 SOLUTION RESPIRATORY (INHALATION)
Status: DISCONTINUED | OUTPATIENT
Start: 2017-09-26 | End: 2017-09-26 | Stop reason: HOSPADM

## 2017-09-26 RX ORDER — PREDNISONE 20 MG/1
60 TABLET ORAL
Status: DISCONTINUED | OUTPATIENT
Start: 2017-09-26 | End: 2017-09-26 | Stop reason: HOSPADM

## 2017-09-26 RX ORDER — LORATADINE 10 MG/1
10 TABLET ORAL DAILY
Status: DISCONTINUED | OUTPATIENT
Start: 2017-09-26 | End: 2017-09-26 | Stop reason: HOSPADM

## 2017-09-26 RX ORDER — IPRATROPIUM BROMIDE AND ALBUTEROL SULFATE 2.5; .5 MG/3ML; MG/3ML
3 SOLUTION RESPIRATORY (INHALATION)
Status: DISCONTINUED | OUTPATIENT
Start: 2017-09-26 | End: 2017-09-26

## 2017-09-26 RX ORDER — IPRATROPIUM BROMIDE AND ALBUTEROL SULFATE 2.5; .5 MG/3ML; MG/3ML
SOLUTION RESPIRATORY (INHALATION)
Status: COMPLETED
Start: 2017-09-26 | End: 2017-09-26

## 2017-09-26 RX ORDER — PREDNISONE 20 MG/1
60 TABLET ORAL
Qty: 12 TAB | Refills: 0 | Status: SHIPPED | OUTPATIENT
Start: 2017-09-27 | End: 2017-10-01

## 2017-09-26 RX ORDER — BUDESONIDE AND FORMOTEROL FUMARATE DIHYDRATE 160; 4.5 UG/1; UG/1
2 AEROSOL RESPIRATORY (INHALATION)
Status: DISCONTINUED | OUTPATIENT
Start: 2017-09-26 | End: 2017-09-26 | Stop reason: HOSPADM

## 2017-09-26 RX ORDER — ALBUTEROL SULFATE 1.25 MG/3ML
2.5 SOLUTION RESPIRATORY (INHALATION)
Status: DISCONTINUED | OUTPATIENT
Start: 2017-09-26 | End: 2017-09-26

## 2017-09-26 RX ADMIN — LORATADINE 10 MG: 10 TABLET ORAL at 10:54

## 2017-09-26 RX ADMIN — MAGNESIUM SULFATE IN WATER 2 G: 40 INJECTION, SOLUTION INTRAVENOUS at 00:29

## 2017-09-26 RX ADMIN — ALBUTEROL SULFATE 2.5 MG: 1.25 SOLUTION RESPIRATORY (INHALATION) at 16:56

## 2017-09-26 RX ADMIN — ALBUTEROL SULFATE 2.5 MG: 2.5 SOLUTION RESPIRATORY (INHALATION) at 00:34

## 2017-09-26 RX ADMIN — BUDESONIDE AND FORMOTEROL FUMARATE DIHYDRATE 2 PUFF: 160; 4.5 AEROSOL RESPIRATORY (INHALATION) at 17:08

## 2017-09-26 RX ADMIN — MULTIPLE VITAMINS W/ MINERALS TAB 1 TABLET: TAB at 10:54

## 2017-09-26 RX ADMIN — IPRATROPIUM BROMIDE AND ALBUTEROL SULFATE 3 ML: 2.5; .5 SOLUTION RESPIRATORY (INHALATION) at 00:42

## 2017-09-26 RX ADMIN — PREDNISONE 60 MG: 20 TABLET ORAL at 10:54

## 2017-09-26 RX ADMIN — METHYLPREDNISOLONE SODIUM SUCCINATE 125 MG: 125 INJECTION, POWDER, FOR SOLUTION INTRAMUSCULAR; INTRAVENOUS at 00:23

## 2017-09-26 RX ADMIN — SODIUM CHLORIDE 100 ML/HR: 900 INJECTION, SOLUTION INTRAVENOUS at 00:23

## 2017-09-26 RX ADMIN — IPRATROPIUM BROMIDE AND ALBUTEROL SULFATE 3 ML: .5; 3 SOLUTION RESPIRATORY (INHALATION) at 00:42

## 2017-09-26 NOTE — ED TRIAGE NOTES
Patient is wheezing , patient has a hx of asthma and has been intubated twice. Patient is in distress.

## 2017-09-26 NOTE — DISCHARGE SUMMARY
Discharge Summary  [facility]      Patient: Arnulfo Barraza Age: 46 y.o. Sex: male  : 1965    MRN: [id number]      DOA: 2017      Discharge Date: 2017      Attending: Dr. Vickie Thompson      PCP: Yudi Euceda, DO        ================================================================    Reason for Admission:   Asthma exacerbation    Discharge Diagnoses:   Asthma Exacerbation    Important notes to PCP/ follow-up studies and evaluations   Asthma: pt is compliant. Started on 5 day course of prednisone. Received 1st day of 60mg while inpatient. May benefit from singulair if symptoms persist. Consider repeat PFTS in 4 to 6 weeks. Pending labs and studies:  none  Operative Procedures:   None    Discharge Medications:     Discharge Medication List as of 2017  6:52 PM      START taking these medications    Details   predniSONE (DELTASONE) 20 mg tablet Take 3 Tabs by mouth daily (with breakfast) for 4 days. Indications: BRONCHIAL ASTHMA, Print, Disp-12 Tab, R-0         CONTINUE these medications which have NOT CHANGED    Details   cyclobenzaprine (FLEXERIL) 10 mg tablet Take 10 mg by mouth daily as needed for Muscle Spasm(s). , Historical Med      fluticasone-salmeterol (ADVAIR DISKUS) 500-50 mcg/dose diskus inhaler Take 1 Puff by inhalation every twelve (12) hours. , Print, Disp-14 Inhaler, R-0      Inhalational Spacing Device (AEROCHAMBER MV) 1 Each by Does Not Apply route as needed. , Print, Disp-1 Device, R-0      Cetirizine (ZYRTEC) 10 mg Cap Take  by mouth.  , Historical Med      multivitamins-minerals-lutein (CENTRUM SILVER) Tab Take  by mouth.  , Historical Med      ascorbic acid (VITAMIN C) 500 mg tablet Take  by mouth.  , Historical Med         STOP taking these medications       HYDROcodone-acetaminophen (NORCO) 5-325 mg per tablet Comments:   Reason for Stopping:         methocarbamol (ROBAXIN-750) 750 mg tablet Comments:   Reason for Stopping:         pseudoephedrine (SUDAFED) 30 mg tablet Comments:   Reason for Stopping:             Disposition: Home    Consultants:    none    Brief Hospital Course (including pertinent history and physical findings)  47 yo M with a history of Asthma and two prior intubations presented to the Emergency Department with increased wheezing despite. He denied precipitating cold symptoms, animal, occupational, or environmental triggers After work on 25 November, he noticed increased wheezing and shortness of breath that did not respond to his home albuterol nebulizer. He has been compliant with his advair twice daily. His peak flow in the Emergency department was 150. He had noted wheezes on exam, but normal respiratory mechanics. He was admitted for asthma exacerbation. Asthma Exacerbation with hx of Asthma, Moderate Persistent and two prior intubations   On discharge, his peak flows had improved to 350. His breathing symptoms with ambulation had also improved. He was compliant with his medications. His predicted peak flow is 600, but patient reports his best lifetime peak flow was 450. His ABG was appropriate for discharge. He commented during his ambulation pulse oximetry trial that he wanted to walk faster. His symptoms improved within several hours after multiple nebulized treatments, IV solumedrol 125, and 60 mg prednisone PO. His serial lung exams noted improvement throughout the day and were clear to auscultation on discharge. He remained afebrile, absent acute findings onc CXR, and did not have leukocytosis, so anti-biotics were not indicated. His respiratory status was near his baseline on discharge by the afternoon on the day of discharge. He was given a work excuse note for the dates of hospitalization and cleared to return to work on 28 Sept 2017.  Pt agreed to schedule a follow up appointment with Aleida Trejo in 48-72 hours.                -Complete five day course of Prednisone 60mg                -PCP follow up in 48-72 hours                -Consider formal  PFTs in 4-6 weeks                    Summarized key findings and results (labs, imaging studies, ECHO, cardiac cath, endoscopies, etc):  9/26 CXR: No acute findings  CBC w/Diff    Lab Results   Component Value Date/Time    WBC 6.4 09/26/2017 10:50 AM    HGB 14.5 09/26/2017 10:50 AM    HCT 42.2 09/26/2017 10:50 AM    PLATELET 842 05/54/1159 10:50 AM    MCV 90.4 09/26/2017 10:50 AM           Chemistry    Lab Results   Component Value Date/Time    Sodium 141 09/26/2017 10:50 AM    Potassium 5.2 09/26/2017 10:50 AM    Chloride 109 09/26/2017 10:50 AM    CO2 19 09/26/2017 10:50 AM    Anion gap 13 09/26/2017 10:50 AM    Glucose 130 09/26/2017 10:50 AM    BUN 18 09/26/2017 10:50 AM    Creatinine 1.37 09/26/2017 10:50 AM    BUN/Creatinine ratio 13 09/26/2017 10:50 AM    GFR est AA >60 09/26/2017 10:50 AM    GFR est non-AA 55 09/26/2017 10:50 AM    Calcium 9.1 09/26/2017 10:50 AM             Functional status and cognitive function:    Ambulates Independently  Status: alert, no distress, appears stated age    Diet: regular    Code status and advanced care plan: Full  Point of Contact:    Patient Education:  Patient was educated on the following topics prior to discharge:Asthma, prednisone    Follow-up:   Follow-up Information     Follow up With Details Comments [address], DO Schedule an appointment as soon as possible for a visit Knippa will call tomorrow and schedule appointment.  [address]  [id number] [facility]  [phone]              ================================================================  Pako Chin MD  PGY-1, [facility]

## 2017-09-26 NOTE — H&P
Admission History and Physical  [address] [facility]      Patient: Tamara Osei MRN: [id number]  CSN: [id number]    YOB: 1965  Age: 46 y.o. Sex: male      DOA: 9/25/2017       HPI:     Tamara Osei is a 46 y.o. male with PMH asthma, now presenting with complaint of chest tightness and wheezing. Pt has h/o asthma, has to use rescue inhaler a few times per week, experienced chest tightness and wheezing yesterday evening. He used his Advair and albuterol but did not feel any improvement. His only other symptom is a dry cough. He has not been ill lately and has no known sick contacts. ED Course: CXR, Duonebs, Solumedrol, Mg    Review of Systems  General ROS: negative for  - chills, fever  ENT ROS: negative for - headaches, hearing change or visual changes  Respiratory ROS: negative for - hemoptysis, positive for cough, chest tightness, shortness of breath, and wheezing  Cardiovascular ROS: negative for - chest pain, edema, or palpitations   Gastrointestinal ROS: negative for - abdominal pain, constipation, diarrhea, nausea/vomiting   Genito-Urinary ROS: negative for - dysuria, hematuria or urinary frequency/urgency  Musculoskeletal ROS: negative for - joint pain, joint swelling or muscle pain  Neurological ROS: negative for - dizziness, headaches  Dermatological ROS: negative for - rash or skin lesion changes      Past Medical History:   Diagnosis Date    Anemia     Asthma     Asthma     Groin swelling        History reviewed. No pertinent surgical history. History reviewed. No pertinent family history.     Social History     Social History    Marital status: SINGLE     Spouse name: N/A    Number of children: N/A    Years of education: N/A     Social History Main Topics    Smoking status: Never Smoker    Smokeless tobacco: Never Used    Alcohol use No    Drug use: None    Sexual activity: Not Asked     Other Topics Concern    None     Social History Narrative       No Known Allergies    Prior to Admission Medications   Prescriptions Last Dose Informant Patient Reported? Taking? Cetirizine (ZYRTEC) 10 mg Cap 2017 at Unknown time  Yes Yes   Sig: Take  by mouth. HYDROcodone-acetaminophen (NORCO) 5-325 mg per tablet Unknown at Unknown time  No No   Sig: Take 1-2 tablets PO every 4-6 hours as needed for pain control. If over the counter ibuprofen or acetaminophen was suggested, then only take the vicodin for pain not well controlled with the over the counter medication. Inhalational Spacing Device (AEROCHAMBER MV) 2017 at Unknown time  No Yes   Si Each by Does Not Apply route as needed. ascorbic acid (VITAMIN C) 500 mg tablet 2017 at Unknown time  Yes Yes   Sig: Take  by mouth. fluticasone-salmeterol (ADVAIR DISKUS) 500-50 mcg/dose diskus inhaler 2017 at Unknown time  No Yes   Sig: Take 1 Puff by inhalation every twelve (12) hours. methocarbamol (ROBAXIN-750) 750 mg tablet Unknown at Unknown time  No No   Sig: Take 1 Tab by mouth three (3) times daily. multivitamins-minerals-lutein (CENTRUM SILVER) Tab 2017 at Unknown time  Yes Yes   Sig: Take  by mouth.     pseudoephedrine (SUDAFED) 30 mg tablet Unknown at Unknown time  No No   Sig: Take 1 Tab by mouth every four (4) hours as needed for Congestion. Facility-Administered Medications: None       Physical Exam:     Patient Vitals for the past 24 hrs:   Pulse Resp BP SpO2   17 2104 - 28 136/88 -   17 2101 93 - - 91 %       Physical Exam:   General:  Alert and Responsive and in No acute distress. HEENT: Conjunctiva pink, sclera anicteric. EOMI. Pharynx moist, nonerythematous. Moist mucous membranes. Thyroid not enlarged, no nodules. No cervical, supraclavicular, occipital or submandibular lymphadenopathy. No other gross abnormalities appreciated. CV:  RRR. No murmurs, rubs, or gallops appreciated. No visible pulsations or thrills. RESP:  Unlabored breathing.  Equal expansion bilaterally. Expiratory wheezes bilaterally. Bilateral chest tenderness   ABD:  Soft, nontender, nondistended. Normoactive bowel sounds. No hepatosplenomegaly. No suprapubic tenderness. MS:  No joint deformity or instability. No atrophy. Neuro:  5/5 strength bilateral upper extremities and lower extremities. A+Ox3. Ext:  No edema. 2+ radial and dp pulses bilaterally. Skin:  No rashes, lesions, or ulcers. Good turgor. IMAGING:     Recent Results (from the past 12 hour(s))   METABOLIC PANEL, BASIC    Collection Time: 09/26/17 12:03 AM   Result Value Ref Range    Sodium 143 136 - 145 mmol/L    Potassium 4.0 3.5 - 5.5 mmol/L    Chloride 109 (H) 100 - 108 mmol/L    CO2 25 21 - 32 mmol/L    Anion gap 9 3.0 - 18 mmol/L    Glucose 95 74 - 99 mg/dL    BUN 23 (H) 7.0 - 18 MG/DL    Creatinine 1.41 (H) 0.6 - 1.3 MG/DL    BUN/Creatinine ratio 16 12 - 20      GFR est AA >60 >60 ml/min/1.73m2    GFR est non-AA 53 (L) >60 ml/min/1.73m2    Calcium 8.8 8.5 - 10.1 MG/DL   MAGNESIUM    Collection Time: 09/26/17 12:03 AM   Result Value Ref Range    Magnesium 1.9 1.6 - 2.6 mg/dL   CBC WITH AUTOMATED DIFF    Collection Time: 09/26/17 12:03 AM   Result Value Ref Range    WBC 6.2 4.6 - 13.2 K/uL    RBC 4.72 4.70 - 5.50 M/uL    HGB 14.7 13.0 - 16.0 g/dL    HCT 42.2 36.0 - 48.0 %    MCV 89.4 74.0 - 97.0 FL    MCH 31.1 24.0 - 34.0 PG    MCHC 34.8 31.0 - 37.0 g/dL    RDW 11.9 11.6 - 14.5 %    PLATELET 858 920 - 356 K/uL    MPV 10.7 9.2 - 11.8 FL    NEUTROPHILS 45 40 - 73 %    LYMPHOCYTES 38 21 - 52 %    MONOCYTES 6 3 - 10 %    EOSINOPHILS 10 (H) 0 - 5 %    BASOPHILS 1 0 - 2 %    ABS. NEUTROPHILS 2.8 1.8 - 8.0 K/UL    ABS. LYMPHOCYTES 2.4 0.9 - 3.6 K/UL    ABS. MONOCYTES 0.4 0.05 - 1.2 K/UL    ABS. EOSINOPHILS 0.6 (H) 0.0 - 0.4 K/UL    ABS. BASOPHILS 0.0 0.0 - 0.1 K/UL    DF AUTOMATED           Assessment/Plan:   46 y.o. male with PMH asthma, now admitted with asthma exacerbation.     Asthma exacerbation: Peak expiratory flow 315 after first Duoneb treatment, 150 after 2nd; pt also received Solumedrol and magnesium sulfate in ED.  - Admit to stepdown  - Albuterol nebulizer q2hrs or sooner PRN  - Prednisone 60mg daily  - Continue home Claritin, Symbicort (takes Zyrtec and Advair at home)  - Daily CBC, BMP, Mg  - Monitor VS  - Supplemental O2 with goal of 92%      Diet: Regular  DVT Prophylaxis: Lovenox  Code Status: Full  Point of Contact: Mey Andujar Mother [phone]    Disposition and anticipated LOS: 2 midnights    Alexandre Moyer MD, PGY-1  [facility]  09/26/17 1:54 AM            Senior Resident History and Physical  [facility]    HPI:     Regino Dudley is a 46 y.o. male with a PMH of asthma, allergic rhinitis, now admitted with complaint of asthma exacerbation. HPI, ROS, PMH, PSH, Family Hx, Social Hx, home medications, and allergies as above in intern H&P. I agree with history as above. Physical Exam:     Visit Vitals    /88    Pulse 93    Resp 28    SpO2 91%       General:  WD, WN, in moderate distress  HEENT:  NCAT. Conjunctiva pink, sclera anicteric. PERRL. EOMI. Pharynx moist, nonerythematous. Moist mucous membranes. Thyroid not enlarged, no nodules. No cervical, supraclavicular, or submandibular lymphadenopathy. CV:  RRR, no mumurs. PMI not displaced. RESP:  Slightly labored breathing with some accessory muscle use. Inspiratory and expiratory wheezes in all lung fields. ABD:  Soft, nontender, nondistended. Normoactive bowel sounds. No hepatosplenomegaly. No suprapubic tenderness. No CVA tenderness. MS:  No joint deformity or instability. No atrophy. Neuro:  5/5 strength bilateral upper extremities and lower extremities. CN II-XII intact. Sensation grossly intact. A+Ox3. Ext:  No edema. 2+ radial and dp pulses bilaterally. Skin:  No rashes, lesions, or ulcers. Good turgor.   RECTAL:  See intern physical exam    Labs and imaging reviewed    Assessment/Plan:   46 y.o. male with a PMH of asthma, now admitted with severe asthma exacerbation. Severe asthma exacerbation: about 50% decrease of best PEF after maximal therapy in ED. Pt has good compliance with home meds so likely 2/2 acute viral URI vs allergy. - admit to stepdown  - received 2g mag sulfate in ED  - q2h albuterol nebulizer  - continue home inhaled steroid with LABA  - received onetime dose methylprednisone in ED -- start daily PO prednisone 60 mg  - O2 NC; titrate to goal >92%  - daily CBC, BMP          *Please see intern note above for additional chronic disease mgmt.     Dariana [address], DO  9/26/2017, 2:26 AM

## 2017-09-26 NOTE — PROGRESS NOTES
Given patient his discharge instructions and RX, verbalized understanding.  Patient awaiting his ride home,alert and oriented

## 2017-09-26 NOTE — PROGRESS NOTES
Care Management Interventions  PCP Verified by CM: Yes (PFM)  Mode of Transport at Discharge:  (family)  Transition of Care Consult (CM Consult): Discharge Planning  Physical Therapy Consult: No  Occupational Therapy Consult: No  Current Support Network: Relative's Home  Confirm Follow Up Transport: Family  Plan discussed with Pt/Family/Caregiver: Yes  Discharge Location  Discharge Placement: Home with family assistance    Pt is a 46year old admitted for asthma exacerbation. Pt is alert and oriented and alone in room. Pt reports that he lives in a 1st floor apt with no steps to enter. Pt reports that prior to admission he was independent in his ADLs and that he has a nebulizer machine at home. Pt reports that he plans to return home on discharge and that he has transportation home with family.

## 2017-09-26 NOTE — ED PROVIDER NOTES
HPI Comments: Seen at: 9/25/2017 9:46 PM    Ritu Cheung is a 46 y.o. male with pertinent PMHx of asthma, presenting to the ED c/o shortness of breath starting earlier today. This was resolved, but then returned at 1900 (3 hours ago). He tries albuterol breathing treatment and advair diskus without improvement. He also reports chest tightness. Pt states he had been intubated twice in the past for asthma exacerbation. No other acute symptoms or complaints were noted. PCP: Jamil Ortega MD       The history is provided by the patient. Past Medical History:   Diagnosis Date    Anemia     Asthma     Asthma     Groin swelling        History reviewed. No pertinent surgical history. History reviewed. No pertinent family history. Social History     Social History    Marital status: SINGLE     Spouse name: N/A    Number of children: N/A    Years of education: N/A     Occupational History    Not on file. Social History Main Topics    Smoking status: Never Smoker    Smokeless tobacco: Never Used    Alcohol use No    Drug use: Not on file    Sexual activity: Not on file     Other Topics Concern    Not on file     Social History Narrative         ALLERGIES: Review of patient's allergies indicates no known allergies. Review of Systems   Constitutional: Negative for activity change, appetite change, diaphoresis and fever. HENT: Negative for congestion, dental problem, ear pain, hearing loss, nosebleeds, postnasal drip, sinus pressure, sneezing and tinnitus. Eyes: Negative for photophobia, discharge, redness and visual disturbance. Respiratory: Positive for chest tightness, shortness of breath and wheezing. Negative for cough, choking and stridor. Cardiovascular: Negative for chest pain, palpitations and leg swelling. Gastrointestinal: Negative for abdominal distention, abdominal pain, anal bleeding and blood in stool.    Genitourinary: Negative for decreased urine volume, difficulty urinating, discharge, dysuria, frequency, hematuria, penile swelling, scrotal swelling, testicular pain and urgency. Musculoskeletal: Negative for arthralgias, back pain, gait problem, joint swelling, myalgias and neck pain. Skin: Negative for color change and pallor. Neurological: Negative for dizziness, tremors, seizures, syncope and headaches. Hematological: Negative for adenopathy. Does not bruise/bleed easily. Psychiatric/Behavioral: Negative for agitation, behavioral problems, confusion and hallucinations. The patient is not nervous/anxious. All other systems reviewed and are negative. Vitals:    09/25/17 2101 09/25/17 2104   BP:  136/88   Pulse: 93    Resp:  28   SpO2: 91%             Physical Exam   Constitutional: He is oriented to person, place, and time. He appears well-developed and well-nourished. He appears distressed (mild). HENT:   Head: Normocephalic and atraumatic. Right Ear: External ear normal.   Left Ear: External ear normal.   Nose: Nose normal.   Mouth/Throat: Oropharynx is clear and moist.   Eyes: Conjunctivae and EOM are normal. Pupils are equal, round, and reactive to light. Right eye exhibits no discharge. No scleral icterus. Neck: Normal range of motion. Neck supple. No JVD present. No thyromegaly present. Cardiovascular: Normal rate, regular rhythm and intact distal pulses. Exam reveals no gallop and no friction rub. No murmur heard. Pulmonary/Chest: He is in respiratory distress (mild). He has wheezes (2+). He has no rales. He exhibits no tenderness. Abdominal: He exhibits no distension and no mass. There is no tenderness. There is no rebound and no guarding. Musculoskeletal: Normal range of motion. He exhibits no edema. Lymphadenopathy:     He has no cervical adenopathy. Neurological: He is alert and oriented to person, place, and time. No cranial nerve deficit. Coordination normal.   Skin: Skin is warm and dry. No rash noted.  No erythema. Psychiatric: He has a normal mood and affect. His behavior is normal. Judgment normal.   Nursing note and vitals reviewed. MDM  Number of Diagnoses or Management Options  Moderate persistent asthma with acute exacerbation:   Diagnosis management comments: 46year old male presents with wheezing. Amount and/or Complexity of Data Reviewed  Clinical lab tests: ordered and reviewed  Tests in the radiology section of CPT®: ordered and reviewed (CXR)  Tests in the medicine section of CPT®: ordered and reviewed (EKG)  Decide to obtain previous medical records or to obtain history from someone other than the patient: yes  Review and summarize past medical records: yes  Discuss the patient with other providers: yes  Independent visualization of images, tracings, or specimens: yes (EKG, CXR)    Critical Care  Total time providing critical care: 30-74 minutes    CRITICAL CARE NOTE:  I have spent 30 minutes of critical care time involved in lab review, consultations with specialist, family decision-making, and documentation. During this entire length of time I was immediately available to the patient. Critical Care: The reason for providing this level of medical care for this critically ill patient was due a critical illness that impaired one or more vital organ systems such that there was a high probability of imminent or life threatening deterioration in the patients condition. This care involved high complexity decision making to assess, manipulate, and support vital system functions, to treat this degree vital organ system failure and to prevent further life threatening deterioration of the patients condition.     ED Course       Procedures      Vitals:  Patient Vitals for the past 12 hrs:   Pulse Resp BP SpO2   09/25/17 2104 - 28 136/88 -   09/25/17 2101 93 - - 91 %       Medications Ordered:  Medications   albuterol (PROVENTIL VENTOLIN) nebulizer solution 2.5 mg (not administered)   albuterol (PROVENTIL VENTOLIN) nebulizer solution 2.5 mg (not administered)   loratadine (CLARITIN) tablet 10 mg (not administered)   budesonide-formoterol (SYMBICORT) 160-4.5 mcg/actuation HFA inhaler 2 Puff (not administered)   . PHARMACY TO SUBSTITUTE PER PROTOCOL (not administered)   enoxaparin (LOVENOX) injection 40 mg (not administered)   predniSONE (DELTASONE) tablet 60 mg (not administered)   albuterol (ACCUNEB) nebulizer solution 2.5 mg (not administered)   methylPREDNISolone (PF) (SOLU-MEDROL) injection 125 mg (125 mg IntraVENous Given 9/26/17 0023)   magnesium sulfate 2 g/50 ml IVPB (premix or compounded) (2 g IntraVENous New Bag 9/26/17 0029)   albuterol-ipratropium (DUO-NEB) 2.5 mg-0.5 mg/3 ml nebulizer solution (3 mL  Given 9/26/17 0042)       Lab Findings:  Recent Results (from the past 12 hour(s))   METABOLIC PANEL, BASIC    Collection Time: 09/26/17 12:03 AM   Result Value Ref Range    Sodium 143 136 - 145 mmol/L    Potassium 4.0 3.5 - 5.5 mmol/L    Chloride 109 (H) 100 - 108 mmol/L    CO2 25 21 - 32 mmol/L    Anion gap 9 3.0 - 18 mmol/L    Glucose 95 74 - 99 mg/dL    BUN 23 (H) 7.0 - 18 MG/DL    Creatinine 1.41 (H) 0.6 - 1.3 MG/DL    BUN/Creatinine ratio 16 12 - 20      GFR est AA >60 >60 ml/min/1.73m2    GFR est non-AA 53 (L) >60 ml/min/1.73m2    Calcium 8.8 8.5 - 10.1 MG/DL   MAGNESIUM    Collection Time: 09/26/17 12:03 AM   Result Value Ref Range    Magnesium 1.9 1.6 - 2.6 mg/dL   CBC WITH AUTOMATED DIFF    Collection Time: 09/26/17 12:03 AM   Result Value Ref Range    WBC 6.2 4.6 - 13.2 K/uL    RBC 4.72 4.70 - 5.50 M/uL    HGB 14.7 13.0 - 16.0 g/dL    HCT 42.2 36.0 - 48.0 %    MCV 89.4 74.0 - 97.0 FL    MCH 31.1 24.0 - 34.0 PG    MCHC 34.8 31.0 - 37.0 g/dL    RDW 11.9 11.6 - 14.5 %    PLATELET 460 578 - 928 K/uL    MPV 10.7 9.2 - 11.8 FL    NEUTROPHILS 45 40 - 73 %    LYMPHOCYTES 38 21 - 52 %    MONOCYTES 6 3 - 10 %    EOSINOPHILS 10 (H) 0 - 5 %    BASOPHILS 1 0 - 2 %    ABS.  NEUTROPHILS 2.8 1.8 - 8.0 K/UL    ABS. LYMPHOCYTES 2.4 0.9 - 3.6 K/UL    ABS. MONOCYTES 0.4 0.05 - 1.2 K/UL    ABS. EOSINOPHILS 0.6 (H) 0.0 - 0.4 K/UL    ABS. BASOPHILS 0.0 0.0 - 0.1 K/UL    DF AUTOMATED         EKG Interpretation by ED physician:  Interpreted at 09/26/2017 at 00:04  Sinus bradycardia @ 56 bpm, No STEMI, no ectopy. X-ray, CT or radiology findings or impressions:  XR CHEST PORT    (Results Pending)   CXR 1 V interpreted by myself showed no acute process. Progress notes, consult notes, or additional procedure notes:  12:33 AM Peak flow 315. Will give additional treatment. 1:10 AM Per nurse, pt continued to have dyspnea even after the additional breathing treatments. ABG ordered. Peak flow decreased to 150.    1:16 AM Consult: I discussed care with [facility] resident. It was a standard discussion including patient history, chief complaint, available diagnostic results, and predicted treatment course. Resident would evaluate pt at bedside. 2:17 AM Resident accepted admission. Pt is admitted under Dr. Ella Aase MD    Diagnosis:   1. Moderate persistent asthma with acute exacerbation        Disposition: ADMIT    Scribe Attestation     Malik Menard acting as a scribe for and in the presence of Elo Morgan MD      September 25, 2017 at 9:59 PM       Provider Attestation:      I personally performed the services described in the documentation, reviewed the documentation, as recorded by the scribe in my presence, and it accurately and completely records my words and actions.  September 25, 2017 at 9:59 PM - Elo Morgan MD

## 2017-09-26 NOTE — PROGRESS NOTES
Pulse Oximetry Monitoring   Supine: 97%  Standin%  Ambulation: 97%/95%/97%/94%/98%/96%     No observed resp distress. Pt asked if he could walk faster.   Assisted by [facility] Dylon HARRINGTON PGY-1

## 2017-09-26 NOTE — PROGRESS NOTES
S: pt states improvement from this morning. B: Respiratory performed Peak flow with reading of 350. Pt reports his best at baseline of 450. By patient report, he is at 78% of his best. By calculated height and weight standards, his optimal peak flow is 600. Ambulated well with NL pulse oximetry. A: Fit to resume home meds with five day burst of 60 prednisone daily(9/26-9/30).      R:   -Symbicort and Neb treatment prior to D/c  -Discharge with PCP follow up

## 2017-09-26 NOTE — PROGRESS NOTES
Intern Progress Note  [facility]       Patient: Bryanna Giordano MRN: [id number]  CSN: [id number]    YOB: 1965  Age: 46 y.o. Sex: male    DOA: 9/25/2017 LOS:  LOS: 0 days                    Subjective:     Acute events:   Breathing improved since admission. Pt endorsed wheezing yesterday evening during dinner with no precipitating fevers, chills, or dyspnea on exertion. Review of Systems   Constitutional: Negative for chills and fever. Respiratory: Negative for cough, hemoptysis and wheezing. Still notes breathing restriction, but no wheezes. Improved since admission, but not back to baseline. Cardiovascular: Negative. Gastrointestinal: Negative for nausea and vomiting.        Objective:      Patient Vitals for the past 24 hrs:   Temp Pulse Resp BP SpO2   09/26/17 0909 97.5 °F (36.4 °C) 76 17 135/75 96 %   09/26/17 0745 - 81 18 125/77 96 %   09/26/17 0730 - 79 19 131/74 97 %   09/26/17 0630 - 74 18 126/71 94 %   09/26/17 0615 - 71 18 117/68 93 %   09/26/17 0600 - 73 20 127/80 94 %   09/26/17 0545 - 96 22 132/84 95 %   09/26/17 0515 - 74 19 118/73 93 %   09/26/17 0400 - 92 18 134/80 93 %   09/26/17 0315 - 75 18 135/67 96 %   09/26/17 0300 - 76 18 133/68 94 %   09/26/17 0245 - 79 18 139/80 94 %   09/26/17 0215 - 84 8 149/84 97 %   09/26/17 0200 97.9 °F (36.6 °C) 90 17 133/65 93 %   09/26/17 0100 - 85 14 132/82 93 %   09/26/17 0000 - 63 16 130/83 96 %   09/25/17 2330 - 78 15 130/83 95 %   09/25/17 2315 - 71 16 130/76 96 %   09/25/17 2300 - 63 21 133/84 95 %   09/25/17 2245 - 66 20 134/75 95 %   09/25/17 2230 - 78 15 132/79 96 %   09/25/17 2215 - 64 21 132/80 94 %   09/25/17 2200 - 65 21 136/75 93 %   09/25/17 2130 - 70 14 138/77 96 %   09/25/17 2115 - 72 16 138/83 94 %   09/25/17 2104 - - 28 136/88 -   09/25/17 2101 - 93 - - 91 %       No intake or output data in the 24 hours ending 09/26/17 0951      Physical Exam:   General:  Alert and Responsive and in No acute distress. HEENT: No cervical LAd, trachea midline, moist membranes CV:  RRR, no rubs gallops or murmurs  RESP:  Unlabored breathing. Lungs clear to auscultation. no wheeze, rales, or rhonchi. Equal expansion bilaterally. Great air movement  ABD:  Soft, nontender, nondistended. No hepatosplenomegaly. No suprapubic tenderness. MS:  No joint deformity or instability. No atrophy. Neuro:  AOx3. Able to move all extremities  Ext:  No edema.        Lab/Data Reviewed:  BMP:   Lab Results   Component Value Date/Time     09/26/2017 12:03 AM    K 4.0 09/26/2017 12:03 AM     (H) 09/26/2017 12:03 AM    CO2 25 09/26/2017 12:03 AM    AGAP 9 09/26/2017 12:03 AM    GLU 95 09/26/2017 12:03 AM    BUN 23 (H) 09/26/2017 12:03 AM    CREA 1.41 (H) 09/26/2017 12:03 AM    GFRAA >60 09/26/2017 12:03 AM    GFRNA 53 (L) 09/26/2017 12:03 AM     CMP:   Lab Results   Component Value Date/Time     09/26/2017 12:03 AM    K 4.0 09/26/2017 12:03 AM     (H) 09/26/2017 12:03 AM    CO2 25 09/26/2017 12:03 AM    AGAP 9 09/26/2017 12:03 AM    GLU 95 09/26/2017 12:03 AM    BUN 23 (H) 09/26/2017 12:03 AM    CREA 1.41 (H) 09/26/2017 12:03 AM    GFRAA >60 09/26/2017 12:03 AM    GFRNA 53 (L) 09/26/2017 12:03 AM    CA 8.8 09/26/2017 12:03 AM    MG 1.9 09/26/2017 12:03 AM     CBC:   Lab Results   Component Value Date/Time    WBC 6.2 09/26/2017 12:03 AM    HGB 14.7 09/26/2017 12:03 AM    HCT 42.2 09/26/2017 12:03 AM     09/26/2017 12:03 AM     Recent Glucose Results:   Lab Results   Component Value Date/Time    GLU 95 09/26/2017 12:03 AM     ABG: No results found for: PH, PHI, PCO2, PCO2I, PO2, PO2I, HCO3, HCO3I, FIO2, FIO2I     Scheduled Medications Reviewed:  Current Facility-Administered Medications   Medication Dose Route Frequency    loratadine (CLARITIN) tablet 10 mg  10 mg Oral DAILY    budesonide-formoterol (SYMBICORT) 160-4.5 mcg/actuation HFA inhaler 2 Puff  2 Puff Inhalation BID RT    multivitamin, tx-iron-ca-min (THERA-M w/ IRON) tablet 1 Tab  1 Tab Oral DAILY    enoxaparin (LOVENOX) injection 40 mg  40 mg SubCUTAneous Q24H    predniSONE (DELTASONE) tablet 60 mg  60 mg Oral DAILY WITH BREAKFAST    albuterol (ACCUNEB) nebulizer solution 2.5 mg  2.5 mg Nebulization Q2HWA         Imaging, microbiology, and EKG/Telemetry:  9/25 CXR: More apparent bronchial wall thickening. No infiltrate    Assessment/Plan     47 yo M admitted for Asthma exacerbation and SANGEETA with hx of intubation for priro asthma attacks. Asthma Exacerbation With Hx of Moderate Persistent Asthma with peak flows decreasing to 150 despite neb teatment. Improving sxs this AM, but not resolved. Pt denies precipitating URI sxs, fevers, or illness. Notes hx of Intubation x2 previosuly with preceeding sxs. Endorses compliance with home Advair BID and prn albuterol neb. Received Solumedrol 125x1 in ED. CXR: More apparent bronchial wall thickening. No infiltrate  Predicted Peak Flow:        -Prednisone 60 dailyx5 days(9/26-10/1)       -CTD  Albuterol neb  Q4H       -CTD symbicort 160/4.5 BID      -Ambulate with Pulse Ox    SANGEETA with baseline 1.1-1.2: Cr:1.4 on admission.      -Recheck BMP      -Consider 1L bolus if repeat Cr abnl    Seasonal Allergies       -Daily claritine    Diet: REG  DVT Prophylaxis: lovenox 40  Code Status: Full  Point of Contact: Luli Blood                  Mother           [phone]    Disposition: Pending PFT, BMP and ambulation.  Possible DC    Daysi Benitez, PGY-1

## 2017-09-26 NOTE — PROGRESS NOTES
Patient charles is Douglas Russell conducted an initial consultation and Spiritual Assessment for Paddy Wilks, who is a 46 y. o.,male. Patients Primary Language is: [place]. According to the patients EMR Taoism Affiliation is: Beni Rosado. The reason the Patient came to the hospital is:   Patient Active Problem List    Diagnosis Date Noted    Acute asthma exacerbation 09/26/2017    Acute kidney injury ([facility] [address] 09/26/2017    Lymphadenopathy 06/08/2012    Asthma     Groin swelling         The  provided the following Interventions:  Initiated a relationship of care and support. Explored issues of charles, belief, spirituality and Jewish/ritual needs while hospitalized. Listened empathically. Provided chaplaincy education. Provided information about Spiritual Care Services. Offered prayer and assurance of continued prayers on patient's behalf. Chart reviewed. The following outcomes where achieved:  Patient shared limited information about both their medical narrative and spiritual journey/beliefs.  confirmed Patient's Taoism Affiliation. Patient processed feeling about current hospitalization. Patient expressed gratitude for 's visit. Assessment:  Patient does not have any Jewish/cultural needs that will affect patients preferences in health care. There are no spiritual or Jewish issues which require intervention at this time. Plan:  Chaplains will continue to follow and will provide pastoral care on an as needed/requested basis.  recommends bedside caregivers page  on duty if patient shows signs of acute spiritual or emotional distress.     [facility] [address]  [phone]

## 2017-09-26 NOTE — DISCHARGE INSTRUCTIONS
Joust Activation    Thank you for requesting access to Joust. Please follow the instructions below to securely access and download your online medical record. Joust allows you to send messages to your doctor, view your test results, renew your prescriptions, schedule appointments, and more. How Do I Sign Up? 1. In your internet browser, go to [email or web address]. 2. Click on the First Time User? Click Here link in the Sign In box. You will see the New Member Sign Up page. 3. Enter your Joust Access Code exactly as it appears below. You will not need to use this code after youve completed the sign-up process. If you do not sign up before the expiration date, you must request a new code. Joust Access Code: [email or web address]  Expires: 2017 12:16 PM (This is the date your Joust access code will )    4. Enter the last four digits of your Social Security Number (xxxx) and Date of Birth (mm/dd/yyyy) as indicated and click Submit. You will be taken to the next sign-up page. 5. Create a Joust ID. This will be your Joust login ID and cannot be changed, so think of one that is secure and easy to remember. 6. Create a Joust password. You can change your password at any time. 7. Enter your Password Reset Question and Answer. This can be used at a later time if you forget your password. 8. Enter your e-mail address. You will receive e-mail notification when new information is available in [address]. 9. Click Sign Up. You can now view and download portions of your medical record. 10. Click the Download Summary menu link to download a portable copy of your medical information. Additional Information    If you have questions, please visit the Frequently Asked Questions section of the Joust website at [email or web address] Remember, Joust is NOT to be used for urgent needs. For medical emergencies, dial 911.       Patient armband removed and shredded    DISCHARGE SUMMARY from Nurse    The following personal items are in your possession at time of discharge:    Dental Appliances: None  Visual Aid: Glasses     Home Medications: None  Jewelry: Necklace  Clothing: At bedside  Other Valuables: Cell Phone             PATIENT INSTRUCTIONS:    After general anesthesia or intravenous sedation, for 24 hours or while taking prescription Narcotics:  · Limit your activities  · Do not drive and operate hazardous machinery  · Do not make important personal or business decisions  · Do  not drink alcoholic beverages  · If you have not urinated within 8 hours after discharge, please contact your surgeon on call. Report the following to your surgeon:  · Excessive pain, swelling, redness or odor of or around the surgical area  · Temperature over 100.5  · Nausea and vomiting lasting longer than 4 hours or if unable to take medications  · Any signs of decreased circulation or nerve impairment to extremity: change in color, persistent  numbness, tingling, coldness or increase pain  · Any questions        What to do at Home:  Recommended activity: Activity as tolerated. Please follow up with Dr. Niesha Frias for appointment. *  Please give a list of your current medications to your Primary Care Provider. *  Please update this list whenever your medications are discontinued, doses are      changed, or new medications (including over-the-counter products) are added. *  Please carry medication information at all times in case of emergency situations. These are general instructions for a healthy lifestyle:    No smoking/ No tobacco products/ Avoid exposure to second hand smoke    Surgeon General's Warning:  Quitting smoking now greatly reduces serious risk to your health.     Obesity, smoking, and sedentary lifestyle greatly increases your risk for illness    A healthy diet, regular physical exercise & weight monitoring are important for maintaining a healthy lifestyle    You may be retaining fluid if you have a history of heart failure or if you experience any of the following symptoms:  Weight gain of 3 pounds or more overnight or 5 pounds in a week, increased swelling in our hands or feet or shortness of breath while lying flat in bed. Please call your doctor as soon as you notice any of these symptoms; do not wait until your next office visit. Recognize signs and symptoms of STROKE:    F-face looks uneven    A-arms unable to move or move unevenly    S-speech slurred or non-existent    T-time-call 911 as soon as signs and symptoms begin-DO NOT go       Back to bed or wait to see if you get better-TIME IS BRAIN. Warning Signs of HEART ATTACK     Call 911 if you have these symptoms:   Chest discomfort. Most heart attacks involve discomfort in the center of the chest that lasts more than a few minutes, or that goes away and comes back. It can feel like uncomfortable pressure, squeezing, fullness, or pain.  Discomfort in other areas of the upper body. Symptoms can include pain or discomfort in one or both arms, the back, neck, jaw, or stomach.  Shortness of breath with or without chest discomfort.  Other signs may include breaking out in a cold sweat, nausea, or lightheadedness. Don't wait more than five minutes to call 911 - MINUTES MATTER! Fast action can save your life. Calling 911 is almost always the fastest way to get lifesaving treatment. Emergency Medical Services staff can begin treatment when they arrive -- up to an hour sooner than if someone gets to the hospital by car. The discharge information has been reviewed with the patient. The patient verbalized understanding.     Discharge medications reviewed with the patient and appropriate educational materials and side effects teaching were provided.          -Continue to drink plenty of fluids  -Return to work on 9/28/2017(note provided to patient)  -Call tomorrow to schedule a follow up appointment with [address]. Follow up with [address] in the next 2 to 7 days. Learning About Asthma Triggers  What are triggers? When you have asthma, certain things can make your symptoms worse. These are called triggers. They include:  · Cigarette smoke or air pollution. · Things you are allergic to, such as:  ¨ Pollen, mold, or dust mites. ¨ Pet hair, skin, or saliva. · Illnesses, like colds, flu, or pneumonia. · Exercise. · Dry, cold air. How do triggers affect asthma? Triggers can make it harder for your lungs to work as they should and can lead to sudden difficulty breathing and other symptoms. When you are around a trigger, an asthma attack is more likely. If your symptoms are severe, you may need emergency treatment or have to go to the hospital for treatment. If you know what your triggers are and can avoid them, you may be able to prevent asthma attacks, reduce how often you have them, and make them less severe. What can you do to avoid triggers? The first thing is to know your triggers. When you are having symptoms, note the things around you that might be causing them. Then look for patterns in what may be triggering your symptoms. Record your triggers on a piece of paper or in an asthma diary. When you have your list of possible triggers, work with your doctor to find ways to avoid them. You also can check how well your lungs are working by measuring your peak expiratory flow (PEF) throughout the day. Your PEF may drop when you are near things that trigger symptoms. Here are some ways to avoid a few common triggers. · Do not smoke or allow others to smoke around you. If you need help quitting, talk to your doctor about stop-smoking programs and medicines. These can increase your chances of quitting for good. · If there is a lot of pollution, pollen, or dust outside, stay at home and keep your windows closed.  Use an air conditioner or air filter in your home. Check your local weather report or newspaper for air quality and pollen reports. · Get a flu shot every year. Talk to your doctor about getting a pneumococcal shot. Wash your hands often to prevent infections. · Avoid exercising outdoors in cold weather. If you are outdoors in cold weather, wear a scarf around your face and breathe through your nose. How can you manage an asthma attack? · If you have an asthma action plan, follow the plan. In general:  ¨ Use your quick-relief inhaler as directed by your doctor. If your symptoms do not get better after you use your medicine, have someone take you to the emergency room. Call an ambulance if needed. ¨ If your doctor has given you other inhaled medicines or steroid pills, take them as directed. Where can you learn more? Go to GlocalReach.be  Enter M564 in the search box to learn more about \"Learning About Asthma Triggers. \"   © 3764-0451 Healthwise, Incorporated. Care instructions adapted under license by [employer] (which disclaims liability or warranty for this information). This care instruction is for use with your licensed healthcare professional. If you have questions about a medical condition or this instruction, always ask your healthcare professional. [address] any warranty or liability for your use of this information. Content Version: 35.2.290561; Last Revised: February 23, 2012                   Asthma: Your Action Plan  Sample Action Plan  Controller medicine action plan  Fill in the blank spaces and boxes that apply for all sections. · Name of your controller medicine:  ¨ ____________________________________________  · How much of this medicine do you take? ¨ ____________________________________________  · How often do you take this medicine? ¨ ____________________________________________  · Other instructions?   ¨ ____________________________________________  Quick-relief medicine action plan  · Name of your quick-relief medicine:  ¨ ____________________________________________  · How much of this medicine do you take? ¨ ____________________________________________  · How often do you take this medicine? ¨ ____________________________________________  Asthma Zones  GREEN ZONE: This is where you want to be! Green zone symptoms  · You have no shortness of breath or chest tightness. You are not coughing or wheezing. · You can do all of your usual activities. · You sleep well at night. Green zone peak flow (if you use a peak flow meter)  · ______ or more (80% or more of your personal best)  Green zone actions (Check the boxes and fill in the blank spaces that apply.)  [ ] You take your controller medicine(s) every day. [ ] Nicholas Toribio are staying away from your asthma triggers. [ ] You take quick-relief medicine (called _____________________) ______ minutes before exercise. YELLOW ZONE: Your asthma is getting worse. Yellow zone symptoms  · You are short of breath or have chest tightness. You are coughing or wheezing. · You have symptoms that keep you up at night. · You can do some, but not all, of your usual activities. Yellow zone peak flow (if you use a peak flow meter)  · ______ to ______ (50% to 79% of your personal best)  Yellow zone actions (Check the boxes and fill in the blank spaces that apply.)  [ ] Take _____ puff(s) of quick-relief medicine called ______________________. Repeat _____ times. [ ] If your symptoms don't get better or your peak flow has not returned to the green zone in 1 hour, then:  · [ ] Take _____ puff(s) of medicine called ______________________. Take it ____ times a day. · [ ] Begin or increase treatment with corticosteroid pills. Take ______ mg of medicine called ____________________________ every __________. · [ ] Call your doctor at this number: ____________________. RED ZONE: Danger! Red zone symptoms  · You are very short of breath.   · You can't do your usual activities. · Quick-relief medicine doesn't help. Or your symptoms don't get better after 24 hours in the yellow zone. Red zone peak flow (if you use a peak flow meter)  · Less than _______ (less than 50% of your personal best)  Red zone actions (Check the boxes and fill in the blank spaces that apply.)  [ ] Take _____ puff(s) of quick-relief medicine called ____________________________. Repeat ______ times. [ ] Begin or increase treatment with corticosteroid pills. Take ________ mg now. [ ] Call your doctor at this number: _________________. If you can't contact your doctor, go to the emergency department. Call 911 or ___________________. [ ] Other numbers you might call are: ___________________________________. When should you call for help? Call 911 anytime you think you may need emergency care. For example, call if:  · You have severe trouble breathing. Call your doctor now or seek immediate medical care if:  · You are in the red zone of your asthma action plan. · You've used your quick-relief medicine but are still having trouble breathing. · You cough up blood. · You have new or worse trouble breathing. · You cough up dark brown or bloody mucus (sputum). Watch closely for changes in your health, and be sure to contact your doctor if:  · You need to use quick-relief medicine more than 2 days each week (unless it's just for exercise). · Your coughing and wheezing get worse. Follow-up care is a key part of your treatment and safety. Be sure to make and go to all appointments, and call your doctor if you are having problems. It's also a good idea to know your test results and keep a list of the medicines you take. Where can you learn more? Go to http://huy-leidy.info/. Enter 10 92 16 in the search box to learn more about \"Asthma: Your Action Plan. \"  Current as of: March 25, 2017  Content Version: 11.3  © 0600-1544 [employer].  Care instructions adapted under license by [employer] (which disclaims liability or warranty for this information). If you have questions about a medical condition or this instruction, always ask your healthcare professional. [address] any warranty or liability for your use of this information.

## 2017-09-27 NOTE — ROUTINE PROCESS
[address] to Javier Solis MD, updated on patient's status. [facility] from case management spoke to patient about discharge. 26  Dr. Geovanna Velázquez in to see patient. 1912  Removed right AC IV.    1939  Patient discharged to home.

## 2018-02-05 ENCOUNTER — HOSPITAL ENCOUNTER (OUTPATIENT)
Dept: GENERAL RADIOLOGY | Age: 53
Discharge: HOME | End: 2018-02-05
Payer: COMMERCIAL

## 2018-02-05 DIAGNOSIS — M54.50 LOW BACK PAIN: ICD-10-CM

## 2018-02-05 PROCEDURE — 72100 X-RAY EXAM L-S SPINE 2/3 VWS: CPT

## 2018-07-13 ENCOUNTER — HOSPITAL ENCOUNTER (OUTPATIENT)
Dept: GENERAL RADIOLOGY | Age: 53
Discharge: HOME | End: 2018-07-13
Payer: COMMERCIAL

## 2018-07-13 DIAGNOSIS — J45.901 ASTHMA WITH ACUTE EXACERBATION: ICD-10-CM

## 2018-07-13 PROCEDURE — 71046 X-RAY EXAM CHEST 2 VIEWS: CPT

## 2018-07-20 ENCOUNTER — HOSPITAL ENCOUNTER (EMERGENCY)
Age: 53
Discharge: HOME | End: 2018-07-20
Attending: EMERGENCY MEDICINE | Admitting: EMERGENCY MEDICINE
Payer: COMMERCIAL

## 2018-07-20 VITALS
TEMPERATURE: 97.8 F | DIASTOLIC BLOOD PRESSURE: 105 MMHG | HEART RATE: 71 BPM | SYSTOLIC BLOOD PRESSURE: 156 MMHG | OXYGEN SATURATION: 96 % | RESPIRATION RATE: 20 BRPM

## 2018-07-20 DIAGNOSIS — J03.90 ACUTE TONSILLITIS, UNSPECIFIED ETIOLOGY: Primary | ICD-10-CM

## 2018-07-20 DIAGNOSIS — R03.0 ELEVATED BLOOD PRESSURE READING: ICD-10-CM

## 2018-07-20 PROCEDURE — 74011250636 HC RX REV CODE- 250/636: Performed by: PHYSICIAN ASSISTANT

## 2018-07-20 PROCEDURE — 99283 EMERGENCY DEPT VISIT LOW MDM: CPT

## 2018-07-20 PROCEDURE — 87880 STREP A ASSAY W/OPTIC: CPT | Performed by: PHYSICIAN ASSISTANT

## 2018-07-20 RX ORDER — DEXAMETHASONE 4 MG/1
10 TABLET ORAL
Status: COMPLETED | OUTPATIENT
Start: 2018-07-20 | End: 2018-07-20

## 2018-07-20 RX ORDER — DEXAMETHASONE 4 MG/1
10 TABLET ORAL EVERY 12 HOURS
Status: DISCONTINUED | OUTPATIENT
Start: 2018-07-20 | End: 2018-07-20

## 2018-07-20 RX ORDER — HYDROCODONE BITARTRATE AND ACETAMINOPHEN 5; 325 MG/1; MG/1
1 TABLET ORAL
Qty: 5 TAB | Refills: 0 | Status: SHIPPED | OUTPATIENT
Start: 2018-07-20

## 2018-07-20 RX ADMIN — DEXAMETHASONE 10 MG: 4 TABLET ORAL at 14:24

## 2018-07-20 NOTE — DISCHARGE INSTRUCTIONS
Tonsillitis: Care Instructions  Your Care Instructions    Tonsillitis is an infection of the tonsils that is caused by bacteria or a virus. The tonsils are in the back of the throat and are part of the immune system. Tonsillitis typically lasts from a few days up to a couple of weeks. Tonsillitis caused by a virus goes away on its own. Tonsillitis caused by the bacteria that causes strep throat is treated with antibiotics. You and your doctor may consider surgery to remove the tonsils (tonsillectomy) if you have serious complications or repeat infections. Follow-up care is a key part of your treatment and safety. Be sure to make and go to all appointments, and call your doctor if you are having problems. It's also a good idea to know your test results and keep a list of the medicines you take. How can you care for yourself at home? · If your doctor prescribed antibiotics, take them as directed. Do not stop taking them just because you feel better. You need to take the full course of antibiotics. · Gargle with warm salt water. This helps reduce swelling and relieve discomfort. Gargle once an hour with 1 teaspoon of salt mixed in 8 fluid ounces of warm water. · Take an over-the-counter pain medicine, such as acetaminophen (Tylenol), ibuprofen (Advil, Motrin), or naproxen (Aleve). Be safe with medicines. Read and follow all instructions on the label. No one younger than 20 should take aspirin. It has been linked to Reye syndrome, a serious illness. · Be careful when taking over-the-counter cold or flu medicines and Tylenol at the same time. Many of these medicines have acetaminophen, which is Tylenol. Read the labels to make sure that you are not taking more than the recommended dose. Too much acetaminophen (Tylenol) can be harmful. · Try an over-the-counter throat spray to relieve throat pain. · Drink plenty of fluids. Fluids may help soothe an irritated throat.  Drink warm or cool liquids (whichever feels better). These include tea, soup, and juice. · Do not smoke, and avoid secondhand smoke. Smoking can make tonsillitis worse. If you need help quitting, talk to your doctor about stop-smoking programs and medicines. These can increase your chances of quitting for good. · Use a vaporizer or humidifier to add moisture to your bedroom. Follow the directions for cleaning the machine. When should you call for help? Call your doctor now or seek immediate medical care if:    · Your pain gets worse on one side of your throat.     · You have a new or higher fever.     · You notice changes in your voice.     · You have trouble opening your mouth.     · You have any trouble breathing.     · You have much more trouble swallowing.     · You have a fever with a stiff neck or a severe headache.     · You are sensitive to light or feel very sleepy or confused.    Watch closely for changes in your health, and be sure to contact your doctor if:    · You do not get better after 2 days. Where can you learn more? Go to http://huy-leidy.info/. Enter R105 in the search box to learn more about \"Tonsillitis: Care Instructions. \"  Current as of: May 12, 2017  Content Version: 11.7  © 7564-5933 [employer], [employer]orporated. Care instructions adapted under license by [employer] (which disclaims liability or warranty for this information). If you have questions about a medical condition or this instruction, always ask your healthcare professional. [place] [address] any warranty or liability for your use of this information.

## 2018-07-20 NOTE — ED TRIAGE NOTES
C/o sore throat with swollen tonsils X 1 weeks,  was seen by PCP and prescribed steroids. Pt able to control secretions without difficulty.   has had similar occurrences with his tonsils swelling but reports has not been this bad

## 2018-07-22 LAB
B-HEM STREP THROAT QL CULT: NEGATIVE
BACTERIA SPEC CULT: NORMAL
SERVICE CMNT-IMP: NORMAL

## 2018-10-24 NOTE — ED NOTES
I have reviewed discharge instructions with the patient. The patient verbalized understanding.
Patient resting on stretcher. Patient denies any needs at this time. Call bell within reach.
Patient returned from CT
Patient to CT
Patient updated with plan of care. Patient resting on stretcher. Patient denies any needs at this time. Call bell within reach.
Yes

## 2018-10-29 ENCOUNTER — HOSPITAL ENCOUNTER (OUTPATIENT)
Dept: LAB | Age: 53
Discharge: HOME | End: 2018-10-29

## 2018-10-29 LAB
HBV SURFACE AB SER QL IA: POSITIVE
HBV SURFACE AB SERPL IA-ACNC: 83.01 MIU/ML
HEP BS AB COMMENT,HBSAC: NORMAL
RUBV IGG SER-IMP: NORMAL
T-SPOT TB TEST (EMPLOYEE),XTBE: NORMAL

## 2018-10-29 PROCEDURE — 86735 MUMPS ANTIBODY: CPT | Performed by: EMERGENCY MEDICINE

## 2018-10-29 PROCEDURE — 36415 COLL VENOUS BLD VENIPUNCTURE: CPT | Performed by: EMERGENCY MEDICINE

## 2018-10-29 PROCEDURE — 86706 HEP B SURFACE ANTIBODY: CPT | Performed by: EMERGENCY MEDICINE

## 2018-10-29 PROCEDURE — 86787 VARICELLA-ZOSTER ANTIBODY: CPT | Performed by: EMERGENCY MEDICINE

## 2018-10-29 PROCEDURE — 86762 RUBELLA ANTIBODY: CPT | Performed by: EMERGENCY MEDICINE

## 2018-10-29 PROCEDURE — 86765 RUBEOLA ANTIBODY: CPT | Performed by: EMERGENCY MEDICINE

## 2018-10-30 LAB
MEV IGG SER IA-ACNC: 81.4 AU/ML
MUV IGG SER IA-ACNC: 96.9 AU/ML
VZV IGG SER IA-ACNC: 2543 INDEX

## 2022-03-20 PROBLEM — N17.9 ACUTE KIDNEY INJURY (HCC): Status: ACTIVE | Noted: 2017-09-26

## 2022-03-20 PROBLEM — J45.901 ACUTE ASTHMA EXACERBATION: Status: ACTIVE | Noted: 2017-09-26

## 2022-11-14 ENCOUNTER — HOSPITAL ENCOUNTER (OUTPATIENT)
Dept: LAB | Age: 57
Discharge: HOME | End: 2022-11-14

## 2022-11-14 LAB — SENTARA SPECIMEN COL,SENBCF: NORMAL

## 2022-11-14 PROCEDURE — 99001 SPECIMEN HANDLING PT-LAB: CPT

## 2024-03-11 ENCOUNTER — HOSPITAL ENCOUNTER (OUTPATIENT)
Facility: HOSPITAL | Age: 59
Discharge: HOME | End: 2024-03-14

## 2024-03-11 LAB
SENTARA SPECIMEN COLLECTION: NORMAL
SENTARA SPECIMEN COLLECTION: NORMAL

## 2024-03-11 PROCEDURE — 99001 SPECIMEN HANDLING PT-LAB: CPT

## 2024-03-13 ENCOUNTER — HOSPITAL ENCOUNTER (OUTPATIENT)
Facility: HOSPITAL | Age: 59
Discharge: HOME | End: 2024-03-16
Payer: COMMERCIAL

## 2024-03-13 DIAGNOSIS — M76.62 ACHILLES TENDINITIS OF LEFT LOWER EXTREMITY: ICD-10-CM

## 2024-03-13 PROCEDURE — 73610 X-RAY EXAM OF ANKLE: CPT

## 2024-08-04 ENCOUNTER — APPOINTMENT (OUTPATIENT)
Facility: HOSPITAL | Age: 59
End: 2024-08-04
Payer: COMMERCIAL

## 2024-08-04 ENCOUNTER — HOSPITAL ENCOUNTER (EMERGENCY)
Facility: HOSPITAL | Age: 59
Discharge: HOME | End: 2024-08-04
Payer: COMMERCIAL

## 2024-08-04 VITALS
HEIGHT: 69 IN | TEMPERATURE: 97.3 F | BODY MASS INDEX: 25.92 KG/M2 | WEIGHT: 175 LBS | OXYGEN SATURATION: 99 % | HEART RATE: 55 BPM | SYSTOLIC BLOOD PRESSURE: 136 MMHG | RESPIRATION RATE: 18 BRPM | DIASTOLIC BLOOD PRESSURE: 83 MMHG

## 2024-08-04 DIAGNOSIS — R07.9 CHEST PAIN, UNSPECIFIED TYPE: Primary | ICD-10-CM

## 2024-08-04 LAB
ALBUMIN SERPL-MCNC: 3.8 G/DL (ref 3.4–5)
ALBUMIN/GLOB SERPL: 1.5 (ref 0.8–1.7)
ALP SERPL-CCNC: 91 U/L (ref 45–117)
ALT SERPL-CCNC: 43 U/L (ref 16–61)
ANION GAP SERPL CALC-SCNC: 5 MMOL/L (ref 3–18)
AST SERPL-CCNC: 18 U/L (ref 10–38)
BASOPHILS # BLD: 0.1 K/UL (ref 0–0.1)
BASOPHILS NFR BLD: 1 % (ref 0–2)
BILIRUB SERPL-MCNC: 0.6 MG/DL (ref 0.2–1)
BUN SERPL-MCNC: 20 MG/DL (ref 7–18)
BUN/CREAT SERPL: 16 (ref 12–20)
CALCIUM SERPL-MCNC: 8.5 MG/DL (ref 8.5–10.1)
CHLORIDE SERPL-SCNC: 110 MMOL/L (ref 100–111)
CO2 SERPL-SCNC: 25 MMOL/L (ref 21–32)
CREAT SERPL-MCNC: 1.29 MG/DL (ref 0.6–1.3)
DIFFERENTIAL METHOD BLD: ABNORMAL
EKG ATRIAL RATE: 61 BPM
EKG DIAGNOSIS: NORMAL
EKG P AXIS: 58 DEGREES
EKG P-R INTERVAL: 190 MS
EKG Q-T INTERVAL: 370 MS
EKG QRS DURATION: 82 MS
EKG QTC CALCULATION (BAZETT): 372 MS
EKG R AXIS: 12 DEGREES
EKG T AXIS: 3 DEGREES
EKG VENTRICULAR RATE: 61 BPM
EOSINOPHIL # BLD: 0.4 K/UL (ref 0–0.4)
EOSINOPHIL NFR BLD: 7 % (ref 0–5)
ERYTHROCYTE [DISTWIDTH] IN BLOOD BY AUTOMATED COUNT: 11.9 % (ref 11.6–14.5)
GLOBULIN SER CALC-MCNC: 2.6 G/DL (ref 2–4)
GLUCOSE SERPL-MCNC: 114 MG/DL (ref 74–99)
HCT VFR BLD AUTO: 43.2 % (ref 36–48)
HGB BLD-MCNC: 14.6 G/DL (ref 13–16)
IMM GRANULOCYTES # BLD AUTO: 0 K/UL (ref 0–0.04)
IMM GRANULOCYTES NFR BLD AUTO: 0 % (ref 0–0.5)
LYMPHOCYTES # BLD: 2.7 K/UL (ref 0.9–3.6)
LYMPHOCYTES NFR BLD: 44 % (ref 21–52)
MAGNESIUM SERPL-MCNC: 1.8 MG/DL (ref 1.6–2.6)
MCH RBC QN AUTO: 30.9 PG (ref 24–34)
MCHC RBC AUTO-ENTMCNC: 33.8 G/DL (ref 31–37)
MCV RBC AUTO: 91.5 FL (ref 78–100)
MONOCYTES # BLD: 0.5 K/UL (ref 0.05–1.2)
MONOCYTES NFR BLD: 8 % (ref 3–10)
NEUTS SEG # BLD: 2.6 K/UL (ref 1.8–8)
NEUTS SEG NFR BLD: 41 % (ref 40–73)
NRBC # BLD: 0 K/UL (ref 0–0.01)
NRBC BLD-RTO: 0 PER 100 WBC
PLATELET # BLD AUTO: 263 K/UL (ref 135–420)
PMV BLD AUTO: 9.8 FL (ref 9.2–11.8)
POTASSIUM SERPL-SCNC: 3.9 MMOL/L (ref 3.5–5.5)
PROT SERPL-MCNC: 6.4 G/DL (ref 6.4–8.2)
RBC # BLD AUTO: 4.72 M/UL (ref 4.35–5.65)
SODIUM SERPL-SCNC: 140 MMOL/L (ref 136–145)
TROPONIN I SERPL HS-MCNC: 63 NG/L (ref 0–78)
TROPONIN I SERPL HS-MCNC: 67 NG/L (ref 0–78)
WBC # BLD AUTO: 6.3 K/UL (ref 4.6–13.2)

## 2024-08-04 PROCEDURE — 71046 X-RAY EXAM CHEST 2 VIEWS: CPT

## 2024-08-04 PROCEDURE — 93005 ELECTROCARDIOGRAM TRACING: CPT | Performed by: EMERGENCY MEDICINE

## 2024-08-04 PROCEDURE — 84484 ASSAY OF TROPONIN QUANT: CPT

## 2024-08-04 PROCEDURE — 94761 N-INVAS EAR/PLS OXIMETRY MLT: CPT

## 2024-08-04 PROCEDURE — 6370000000 HC RX 637 (ALT 250 FOR IP): Performed by: PHYSICIAN ASSISTANT

## 2024-08-04 PROCEDURE — 80053 COMPREHEN METABOLIC PANEL: CPT

## 2024-08-04 PROCEDURE — 99285 EMERGENCY DEPT VISIT HI MDM: CPT

## 2024-08-04 PROCEDURE — 83735 ASSAY OF MAGNESIUM: CPT

## 2024-08-04 PROCEDURE — 85025 COMPLETE CBC W/AUTO DIFF WBC: CPT

## 2024-08-04 PROCEDURE — 93010 ELECTROCARDIOGRAM REPORT: CPT | Performed by: INTERNAL MEDICINE

## 2024-08-04 RX ORDER — NITROGLYCERIN 0.4 MG/1
0.4 TABLET SUBLINGUAL ONCE
Status: COMPLETED | OUTPATIENT
Start: 2024-08-04 | End: 2024-08-04

## 2024-08-04 RX ORDER — ACETAMINOPHEN 325 MG/1
650 TABLET ORAL
Status: COMPLETED | OUTPATIENT
Start: 2024-08-04 | End: 2024-08-04

## 2024-08-04 RX ADMIN — ACETAMINOPHEN 325MG 650 MG: 325 TABLET ORAL at 10:35

## 2024-08-04 RX ADMIN — NITROGLYCERIN 0.4 MG: 0.4 TABLET SUBLINGUAL at 09:05

## 2024-08-04 ASSESSMENT — PAIN DESCRIPTION - LOCATION
LOCATION: CHEST
LOCATION: CHEST;HEAD
LOCATION: CHEST
LOCATION: CHEST

## 2024-08-04 ASSESSMENT — PAIN - FUNCTIONAL ASSESSMENT
PAIN_FUNCTIONAL_ASSESSMENT: ACTIVITIES ARE NOT PREVENTED
PAIN_FUNCTIONAL_ASSESSMENT: 0-10
PAIN_FUNCTIONAL_ASSESSMENT: ACTIVITIES ARE NOT PREVENTED
PAIN_FUNCTIONAL_ASSESSMENT: ACTIVITIES ARE NOT PREVENTED

## 2024-08-04 ASSESSMENT — PAIN SCALES - GENERAL
PAINLEVEL_OUTOF10: 7
PAINLEVEL_OUTOF10: 2
PAINLEVEL_OUTOF10: 1
PAINLEVEL_OUTOF10: 7

## 2024-08-04 ASSESSMENT — PAIN DESCRIPTION - ORIENTATION
ORIENTATION: MID
ORIENTATION: MID

## 2024-08-04 NOTE — ED TRIAGE NOTES
Patient reports that started yesterday and worsened this morning starting at approx 0200. Takes asa daily. Hx of cardiac cath. Also reports hx of asthma that has been intubated in past for. Took Advair and zyrtec this morning. Denies any cough recently.

## 2024-08-04 NOTE — ED PROVIDER NOTES
EMERGENCY DEPARTMENT HISTORY AND PHYSICAL EXAM      Date: 8/4/2024  Patient Name: Angel Contreras    History of Presenting Illness     Chief Complaint   Patient presents with    Chest Pain    Shortness of Breath       History (Context): Angel Contreras is a 59 y.o. male with significant past medical history of asthma, anemia presents ambulatory to the ED today. Patient reports around 0200 this morning he woke with midsternal chest tightness that at times radiates to his jaw. Patient reports he then fell back asleep and awoke with the symptoms.  Pt reports mild symptoms currently.  Denies shortness of breath.  Denies pleuritic chest pain.  Denies pain with movement.  Patient reports taking Rey aspirin with moderate relief of symptoms.  Denies previous cardiac history.  Denies lower leg pain or swelling      PCP: Nicholas Valencia, DO    No current facility-administered medications for this encounter.     Current Outpatient Medications   Medication Sig Dispense Refill    ascorbic acid (VITAMIN C) 500 MG tablet Take by mouth      Cetirizine HCl 10 MG CAPS Take by mouth      cyclobenzaprine (FLEXERIL) 10 MG tablet Take 10 mg by mouth daily as needed      fluticasone-salmeterol (ADVAIR DISKUS) 500-50 MCG/ACT AEPB diskus inhaler Inhale 1 puff into the lungs every 12 hours      HYDROcodone-acetaminophen (NORCO) 5-325 MG per tablet Take 1 tablet by mouth every 6 hours as needed.         Past History     Past Medical History:   Past Medical History:   Diagnosis Date    Anemia     Asthma     Asthma     Groin swelling        Past Surgical History:  No past surgical history on file.    Family History:  No family history on file.    Social History:   Social History     Tobacco Use    Smoking status: Never    Smokeless tobacco: Never   Substance Use Topics    Alcohol use: No       Allergies:  No Known Allergies    PMH, PSH, family history, social history, allergies reviewed with the patient with significant items noted above.  Review

## 2025-07-24 ENCOUNTER — APPOINTMENT (OUTPATIENT)
Facility: HOSPITAL | Age: 60
End: 2025-07-24
Payer: COMMERCIAL

## 2025-07-24 ENCOUNTER — HOSPITAL ENCOUNTER (EMERGENCY)
Facility: HOSPITAL | Age: 60
Discharge: HOME | End: 2025-07-24
Attending: STUDENT IN AN ORGANIZED HEALTH CARE EDUCATION/TRAINING PROGRAM
Payer: COMMERCIAL

## 2025-07-24 VITALS
HEART RATE: 68 BPM | BODY MASS INDEX: 27.99 KG/M2 | TEMPERATURE: 98.1 F | SYSTOLIC BLOOD PRESSURE: 145 MMHG | OXYGEN SATURATION: 99 % | HEIGHT: 69 IN | DIASTOLIC BLOOD PRESSURE: 86 MMHG | RESPIRATION RATE: 18 BRPM | WEIGHT: 189 LBS

## 2025-07-24 DIAGNOSIS — K59.00 CONSTIPATION, UNSPECIFIED CONSTIPATION TYPE: Primary | ICD-10-CM

## 2025-07-24 DIAGNOSIS — R10.84 GENERALIZED ABDOMINAL PAIN: ICD-10-CM

## 2025-07-24 LAB
ALBUMIN SERPL-MCNC: 3.8 G/DL (ref 3.4–5)
ALBUMIN/GLOB SERPL: 1.5 (ref 0.8–1.7)
ALP SERPL-CCNC: 90 U/L (ref 45–117)
ALT SERPL-CCNC: 34 U/L (ref 10–50)
ANION GAP SERPL CALC-SCNC: 12 MMOL/L (ref 3–18)
APPEARANCE UR: CLEAR
AST SERPL-CCNC: 23 U/L (ref 10–38)
BASOPHILS # BLD: 0.07 K/UL (ref 0–0.1)
BASOPHILS NFR BLD: 1.2 % (ref 0–2)
BILIRUB SERPL-MCNC: 0.4 MG/DL (ref 0.2–1)
BILIRUB UR QL: NEGATIVE
BUN SERPL-MCNC: 15 MG/DL (ref 6–23)
BUN/CREAT SERPL: 11 (ref 12–20)
CALCIUM SERPL-MCNC: 9.7 MG/DL (ref 8.5–10.1)
CHLORIDE SERPL-SCNC: 103 MMOL/L (ref 98–107)
CO2 SERPL-SCNC: 23 MMOL/L (ref 21–32)
COLOR UR: YELLOW
CREAT SERPL-MCNC: 1.32 MG/DL (ref 0.6–1.3)
DIFFERENTIAL METHOD BLD: ABNORMAL
EOSINOPHIL # BLD: 0.38 K/UL (ref 0–0.4)
EOSINOPHIL NFR BLD: 6.5 % (ref 0–5)
ERYTHROCYTE [DISTWIDTH] IN BLOOD BY AUTOMATED COUNT: 11.9 % (ref 11.6–14.5)
GLOBULIN SER CALC-MCNC: 2.6 G/DL (ref 2–4)
GLUCOSE SERPL-MCNC: 92 MG/DL (ref 74–108)
GLUCOSE UR STRIP.AUTO-MCNC: NEGATIVE MG/DL
HCT VFR BLD AUTO: 42.7 % (ref 36–48)
HGB BLD-MCNC: 14 G/DL (ref 13–16)
HGB UR QL STRIP: NEGATIVE
IMM GRANULOCYTES # BLD AUTO: 0.01 K/UL (ref 0–0.04)
IMM GRANULOCYTES NFR BLD AUTO: 0.2 % (ref 0–0.5)
KETONES UR QL STRIP.AUTO: NEGATIVE MG/DL
LEUKOCYTE ESTERASE UR QL STRIP.AUTO: NEGATIVE
LIPASE SERPL-CCNC: 56 U/L (ref 13–75)
LYMPHOCYTES # BLD: 2.38 K/UL (ref 0.9–3.6)
LYMPHOCYTES NFR BLD: 40.8 % (ref 21–52)
MCH RBC QN AUTO: 29.4 PG (ref 24–34)
MCHC RBC AUTO-ENTMCNC: 32.8 G/DL (ref 31–37)
MCV RBC AUTO: 89.5 FL (ref 78–100)
MONOCYTES # BLD: 0.43 K/UL (ref 0.05–1.2)
MONOCYTES NFR BLD: 7.4 % (ref 3–10)
NEUTS SEG # BLD: 2.57 K/UL (ref 1.8–8)
NEUTS SEG NFR BLD: 43.9 % (ref 40–73)
NITRITE UR QL STRIP.AUTO: NEGATIVE
NRBC # BLD: 0 K/UL (ref 0–0.01)
NRBC BLD-RTO: 0 PER 100 WBC
PH UR STRIP: 6 (ref 5–8)
PLATELET # BLD AUTO: 282 K/UL (ref 135–420)
PMV BLD AUTO: 10.1 FL (ref 9.2–11.8)
POTASSIUM SERPL-SCNC: 4.5 MMOL/L (ref 3.5–5.5)
PROT SERPL-MCNC: 6.4 G/DL (ref 6.4–8.2)
PROT UR STRIP-MCNC: NEGATIVE MG/DL
RBC # BLD AUTO: 4.77 M/UL (ref 4.35–5.65)
SODIUM SERPL-SCNC: 137 MMOL/L (ref 136–145)
SP GR UR REFRACTOMETRY: 1.02 (ref 1–1.03)
UROBILINOGEN UR QL STRIP.AUTO: 1 EU/DL (ref 0.2–1)
WBC # BLD AUTO: 5.8 K/UL (ref 4.6–13.2)

## 2025-07-24 PROCEDURE — 6370000000 HC RX 637 (ALT 250 FOR IP)

## 2025-07-24 PROCEDURE — 74177 CT ABD & PELVIS W/CONTRAST: CPT

## 2025-07-24 PROCEDURE — 80053 COMPREHEN METABOLIC PANEL: CPT

## 2025-07-24 PROCEDURE — 83690 ASSAY OF LIPASE: CPT

## 2025-07-24 PROCEDURE — 6360000004 HC RX CONTRAST MEDICATION

## 2025-07-24 PROCEDURE — 85025 COMPLETE CBC W/AUTO DIFF WBC: CPT

## 2025-07-24 PROCEDURE — 81003 URINALYSIS AUTO W/O SCOPE: CPT

## 2025-07-24 PROCEDURE — 99285 EMERGENCY DEPT VISIT HI MDM: CPT

## 2025-07-24 RX ORDER — POLYETHYLENE GLYCOL 3350 17 G/17G
17 POWDER, FOR SOLUTION ORAL DAILY
Qty: 1 EACH | Refills: 3 | Status: SHIPPED | OUTPATIENT
Start: 2025-07-24 | End: 2025-08-23

## 2025-07-24 RX ORDER — IOPAMIDOL 612 MG/ML
100 INJECTION, SOLUTION INTRAVASCULAR
Status: COMPLETED | OUTPATIENT
Start: 2025-07-24 | End: 2025-07-24

## 2025-07-24 RX ORDER — ONDANSETRON 4 MG/1
4 TABLET, ORALLY DISINTEGRATING ORAL
Status: COMPLETED | OUTPATIENT
Start: 2025-07-24 | End: 2025-07-24

## 2025-07-24 RX ORDER — FAMOTIDINE 20 MG/1
20 TABLET, FILM COATED ORAL
Status: COMPLETED | OUTPATIENT
Start: 2025-07-24 | End: 2025-07-24

## 2025-07-24 RX ORDER — ONDANSETRON 4 MG/1
4 TABLET, FILM COATED ORAL DAILY PRN
Qty: 30 TABLET | Refills: 0 | Status: SHIPPED | OUTPATIENT
Start: 2025-07-24

## 2025-07-24 RX ADMIN — ONDANSETRON 4 MG: 4 TABLET, ORALLY DISINTEGRATING ORAL at 21:28

## 2025-07-24 RX ADMIN — FAMOTIDINE 20 MG: 20 TABLET, FILM COATED ORAL at 19:46

## 2025-07-24 RX ADMIN — IOPAMIDOL 100 ML: 612 INJECTION, SOLUTION INTRAVENOUS at 20:35

## 2025-07-24 RX ADMIN — ALUMINUM HYDROXIDE AND MAGNESIUM HYDROXIDE 20 ML: 200; 200 SUSPENSION ORAL at 19:46

## 2025-07-24 ASSESSMENT — ENCOUNTER SYMPTOMS
CONSTIPATION: 1
ABDOMINAL PAIN: 1
BACK PAIN: 0
DIARRHEA: 0
NAUSEA: 1
VOMITING: 0

## 2025-07-24 ASSESSMENT — PAIN - FUNCTIONAL ASSESSMENT: PAIN_FUNCTIONAL_ASSESSMENT: 0-10

## 2025-07-24 ASSESSMENT — PAIN DESCRIPTION - DESCRIPTORS: DESCRIPTORS: ACHING

## 2025-07-24 ASSESSMENT — PAIN SCALES - GENERAL: PAINLEVEL_OUTOF10: 7

## 2025-07-24 NOTE — ED TRIAGE NOTES
Pt complaining of abdominal cramping , nausea and bloated x few days now , pt is taking Dicyclomine , with no relief.

## 2025-07-24 NOTE — ED PROVIDER NOTES
EMERGENCY DEPARTMENT HISTORY AND PHYSICAL EXAM      Date: 7/24/2025  Patient Name: Angel Contreras    History of Presenting Illness     Chief Complaint   Patient presents with    Abdominal Pain       History (Context): Angel Contreras is a 60 y.o. male past medical history of asthma, lymphadenopathy presents to the ED today with chief complaint of 1 week of worsening abdominal cramping.  Patient notes that this been going on for 1 year and was told by GI that he may have IBS.  He was supposed to avoid lactose and try to increase his probiotic.  Patient says he has been doing that and taking dicyclomine but that while it was previously helping it is not currently.    Review of Systems   Constitutional:  Negative for chills, fatigue and fever.   Respiratory:  Negative for shortness of breath.    Cardiovascular:  Negative for chest pain and leg swelling.   Gastrointestinal:  Positive for abdominal pain, constipation and nausea. Negative for diarrhea and vomiting.   Genitourinary:  Negative for dysuria.   Musculoskeletal:  Negative for arthralgias, back pain and myalgias.   Neurological:  Negative for dizziness, syncope, light-headedness and numbness.         PCP: Nicholas Valencia, DO    No current facility-administered medications for this encounter.     Current Outpatient Medications   Medication Sig Dispense Refill    ondansetron (ZOFRAN) 4 MG tablet Take 1 tablet by mouth daily as needed for Nausea or Vomiting 30 tablet 0    polyethylene glycol (GLYCOLAX) 17 GM/SCOOP powder Take 17 g by mouth daily 1 each 3    ascorbic acid (VITAMIN C) 500 MG tablet Take by mouth      Cetirizine HCl 10 MG CAPS Take by mouth      cyclobenzaprine (FLEXERIL) 10 MG tablet Take 10 mg by mouth daily as needed      fluticasone-salmeterol (ADVAIR DISKUS) 500-50 MCG/ACT AEPB diskus inhaler Inhale 1 puff into the lungs every 12 hours      HYDROcodone-acetaminophen (NORCO) 5-325 MG per tablet Take 1 tablet by mouth every 6 hours as needed.